# Patient Record
Sex: FEMALE | Race: BLACK OR AFRICAN AMERICAN | Employment: UNEMPLOYED | URBAN - METROPOLITAN AREA
[De-identification: names, ages, dates, MRNs, and addresses within clinical notes are randomized per-mention and may not be internally consistent; named-entity substitution may affect disease eponyms.]

---

## 2017-05-10 ENCOUNTER — HOSPITAL ENCOUNTER (EMERGENCY)
Age: 27
Discharge: HOME | End: 2017-05-10
Attending: EMERGENCY MEDICINE
Payer: SELF-PAY

## 2017-05-10 VITALS
RESPIRATION RATE: 16 BRPM | TEMPERATURE: 98.7 F | HEIGHT: 72 IN | HEART RATE: 94 BPM | BODY MASS INDEX: 28.71 KG/M2 | OXYGEN SATURATION: 98 % | SYSTOLIC BLOOD PRESSURE: 136 MMHG | WEIGHT: 212 LBS | DIASTOLIC BLOOD PRESSURE: 86 MMHG

## 2017-05-10 DIAGNOSIS — L02.31 ABSCESS OF BUTTOCK, RIGHT: Primary | ICD-10-CM

## 2017-05-10 PROCEDURE — 99282 EMERGENCY DEPT VISIT SF MDM: CPT

## 2017-05-10 RX ORDER — TRAMADOL HYDROCHLORIDE 50 MG/1
50 TABLET ORAL
Qty: 10 TAB | Refills: 0 | Status: ON HOLD | OUTPATIENT
Start: 2017-05-10 | End: 2018-02-28

## 2017-05-10 RX ORDER — SULFAMETHOXAZOLE AND TRIMETHOPRIM 800; 160 MG/1; MG/1
2 TABLET ORAL 2 TIMES DAILY
Qty: 40 TAB | Refills: 0 | Status: SHIPPED | OUTPATIENT
Start: 2017-05-10 | End: 2017-05-20

## 2017-05-10 RX ORDER — CEPHALEXIN 500 MG/1
500 CAPSULE ORAL 4 TIMES DAILY
Qty: 40 CAP | Refills: 0 | Status: SHIPPED | OUTPATIENT
Start: 2017-05-10 | End: 2017-05-20

## 2017-05-10 RX ORDER — NAPROXEN 500 MG/1
500 TABLET ORAL
Qty: 20 TAB | Refills: 0 | Status: ON HOLD | OUTPATIENT
Start: 2017-05-10 | End: 2018-02-28

## 2017-05-10 NOTE — ED PROVIDER NOTES
Patient is a 33 y.o. female presenting with abscess. The history is provided by the patient. Abscess    This is a new (Painful red nodule to Rt buttock near anus x 1 week w/ second nodule next to it x 2 days.) problem. The current episode started more than 1 week ago. The problem has been gradually worsening. There has been no fever. The rash is present on the right buttock. The pain is at a severity of 9/10. The pain has been constant since onset. Associated symptoms include pain. Pertinent negatives include no blisters, no itching, no weeping and no hives. She has tried nothing for the symptoms. Past Medical History:   Diagnosis Date    Hypertension     Other ill-defined conditions     muscle spasms    Other ill-defined conditions     a fib    Other ill-defined conditions     angina       Past Surgical History:   Procedure Laterality Date    HX ORTHOPAEDIC      left knee         History reviewed. No pertinent family history. Social History     Social History    Marital status: SINGLE     Spouse name: N/A    Number of children: N/A    Years of education: N/A     Occupational History    Not on file. Social History Main Topics    Smoking status: Current Every Day Smoker     Years: 0.50    Smokeless tobacco: Not on file    Alcohol use No      Comment: occassionally    Drug use: Yes     Special: Marijuana    Sexual activity: Yes     Partners: Male     Birth control/ protection: None     Other Topics Concern    Not on file     Social History Narrative         ALLERGIES: Onion    Review of Systems   Constitutional: Negative. Negative for activity change, chills and fever. HENT: Negative. Negative for congestion, ear pain, facial swelling and sore throat. Eyes: Negative. Negative for pain. Respiratory: Negative. Negative for cough and shortness of breath. Cardiovascular: Negative for chest pain. Gastrointestinal: Positive for vomiting (Pt states she vomitted last PM d/t pain. No n/v currrently. ). Negative for abdominal pain, constipation, diarrhea and nausea. Genitourinary: Negative. Negative for dysuria. Musculoskeletal: Negative. Negative for joint swelling. Skin: Positive for rash. Negative for itching and wound. Neurological: Negative for dizziness, light-headedness, numbness and headaches. Psychiatric/Behavioral: Negative. Vitals:    05/10/17 1744   BP: 136/86   Pulse: 94   Resp: 16   Temp: 98.7 °F (37.1 °C)   SpO2: 98%   Weight: 96.2 kg (212 lb)   Height: 6' 1\" (1.854 m)            Physical Exam   Constitutional: She is oriented to person, place, and time. She appears well-developed and well-nourished. No distress. HENT:   Head: Normocephalic and atraumatic. Right Ear: Hearing and external ear normal.   Left Ear: Hearing and external ear normal.   Nose: Nose normal.   Eyes: Conjunctivae and EOM are normal. Pupils are equal, round, and reactive to light. Neck: Normal range of motion. Pulmonary/Chest: Effort normal. No respiratory distress. Neurological: She is alert and oriented to person, place, and time. No cranial nerve deficit. Skin: Skin is warm and dry. Rash noted. Rash is nodular and pustular. She is not diaphoretic.        2cm dm area of erythema and edema to Rt buttock near anus. +TTP. Neg fluctuance, pustular fluid draining, induration, drainage. Psychiatric: She has a normal mood and affect. Her behavior is normal. Judgment and thought content normal.   Nursing note and vitals reviewed. MDM  Number of Diagnoses or Management Options  Abscess of buttock, right:   Diagnosis management comments: DDx: abscess, perineal abscess, bartholin gland/ cyst/abscess, cellulitis    LABORATORY TESTS:  No results found for this or any previous visit (from the past 12 hour(s)).     IMAGING RESULTS:  No orders to display    MEDICATIONS GIVEN:  Medications - No data to display    IMPRESSION:  Abscess of buttock, right  (primary encounter diagnosis)    PLAN:  1. Current Discharge Medication List    START taking these medications    cephALEXin (KEFLEX) 500 mg capsule  Take 1 Cap by mouth four (4) times daily for 10 days. Qty: 40 Cap Refills: 0    trimethoprim-sulfamethoxazole (BACTRIM DS) 160-800 mg per tablet  Take 2 Tabs by mouth two (2) times a day for 10 days. Qty: 40 Tab Refills: 0    traMADol (ULTRAM) 50 mg tablet  Take 1 Tab by mouth every six (6) hours as needed for Pain. Max Daily Amount: 200 mg. Qty: 10 Tab Refills: 0    naproxen (NAPROSYN) 500 mg tablet  Take 1 Tab by mouth two (2) times daily as needed. Qty: 20 Tab Refills: 0        2. Follow-up Information     Follow up With Details Comments [address], [place] Schedule an appointment as soon as possible for a   visit in 1 week As needed, If symptoms worsen [address] [facility] [place] [address]  [phone]      Vadim An MD Schedule an appointment as soon as possible for a visit   in 1 week As needed, If symptoms worsen [address]  [place] [address]  [phone]        Return to ED if worse               Patient Progress  Patient progress: stable    ED Course       Procedures    6:52 PM  I have discussed with patient their diagnosis, treatment, and follow up plan. The patient agrees to follow up as outlined in discharge paperwork and also to return to the ED with any worsening.  Luca Mead PA-C

## 2017-05-10 NOTE — DISCHARGE INSTRUCTIONS

## 2017-06-19 ENCOUNTER — HOSPITAL ENCOUNTER (EMERGENCY)
Age: 27
Discharge: HOME | End: 2017-06-19
Attending: EMERGENCY MEDICINE
Payer: SELF-PAY

## 2017-06-19 VITALS
TEMPERATURE: 99 F | OXYGEN SATURATION: 97 % | HEART RATE: 93 BPM | WEIGHT: 209 LBS | BODY MASS INDEX: 28.31 KG/M2 | RESPIRATION RATE: 20 BRPM | HEIGHT: 72 IN | DIASTOLIC BLOOD PRESSURE: 81 MMHG | SYSTOLIC BLOOD PRESSURE: 129 MMHG

## 2017-06-19 DIAGNOSIS — L03.317 CELLULITIS OF BUTTOCK: ICD-10-CM

## 2017-06-19 DIAGNOSIS — L02.91 ABSCESS: Primary | ICD-10-CM

## 2017-06-19 PROCEDURE — 74011250637 HC RX REV CODE- 250/637: Performed by: EMERGENCY MEDICINE

## 2017-06-19 PROCEDURE — 99283 EMERGENCY DEPT VISIT LOW MDM: CPT

## 2017-06-19 PROCEDURE — 74011000250 HC RX REV CODE- 250: Performed by: EMERGENCY MEDICINE

## 2017-06-19 PROCEDURE — 75810000289 HC I&D ABSCESS SIMP/COMP/MULT

## 2017-06-19 RX ORDER — LIDOCAINE HYDROCHLORIDE 20 MG/ML
10 INJECTION, SOLUTION INFILTRATION; PERINEURAL ONCE
Status: COMPLETED | OUTPATIENT
Start: 2017-06-19 | End: 2017-06-19

## 2017-06-19 RX ORDER — HYDROCODONE BITARTRATE AND ACETAMINOPHEN 5; 325 MG/1; MG/1
1 TABLET ORAL
Qty: 6 TAB | Refills: 0 | Status: ON HOLD | OUTPATIENT
Start: 2017-06-19 | End: 2018-02-28

## 2017-06-19 RX ORDER — CLINDAMYCIN HYDROCHLORIDE 150 MG/1
300 CAPSULE ORAL
Status: COMPLETED | OUTPATIENT
Start: 2017-06-19 | End: 2017-06-19

## 2017-06-19 RX ORDER — CLINDAMYCIN HYDROCHLORIDE 300 MG/1
300 CAPSULE ORAL 4 TIMES DAILY
Qty: 40 CAP | Refills: 0 | Status: ON HOLD | OUTPATIENT
Start: 2017-06-19 | End: 2018-02-28

## 2017-06-19 RX ORDER — LIDOCAINE HYDROCHLORIDE 20 MG/ML
INJECTION, SOLUTION INFILTRATION; PERINEURAL
Status: DISCONTINUED
Start: 2017-06-19 | End: 2017-06-19 | Stop reason: HOSPADM

## 2017-06-19 RX ADMIN — CLINDAMYCIN HYDROCHLORIDE 300 MG: 150 CAPSULE ORAL at 01:47

## 2017-06-19 RX ADMIN — LIDOCAINE HYDROCHLORIDE 200 MG: 20 INJECTION, SOLUTION INFILTRATION; PERINEURAL at 01:32

## 2017-06-19 NOTE — ED NOTES
Patient given copy of dc instructions and 2 paper script(s) and 0 electronic scripts. Patient verbalized understanding of instructions and script(s). Patient given a current medication reconciliation form and verbalized understanding of their medications. Patient verbalized understanding of the importance of discussing medications with his or her physician or clinic they will be following up with. Patient alert and oriented and in no acute distress. Patient verbalizes pain of 8 out of 10. Pt discharged with prescription for pain management. Patient offered wheelchair from treatment area to hospital entrance, patient declined wheelchair. Patient discharged home with friend.

## 2017-06-19 NOTE — DISCHARGE INSTRUCTIONS
Skin Abscess: Care Instructions  Your Care Instructions    A skin abscess is a bacterial infection that forms a pocket of pus. A boil is a kind of skin abscess. The doctor may have cut an opening in the abscess so that the pus can drain out. You may have gauze in the cut so that the abscess will stay open and keep draining. You may need antibiotics. You will need to follow up with your doctor to make sure the infection has gone away. The doctor has checked you carefully, but problems can develop later. If you notice any problems or new symptoms, get medical treatment right away. Follow-up care is a key part of your treatment and safety. Be sure to make and go to all appointments, and call your doctor if you are having problems. It's also a good idea to know your test results and keep a list of the medicines you take. How can you care for yourself at home? · Apply warm and dry compresses, a heating pad set on low, or a hot water bottle 3 or 4 times a day for pain. Keep a cloth between the heat source and your skin. · If your doctor prescribed antibiotics, take them as directed. Do not stop taking them just because you feel better. You need to take the full course of antibiotics. · Take pain medicines exactly as directed. ¨ If the doctor gave you a prescription medicine for pain, take it as prescribed. ¨ If you are not taking a prescription pain medicine, ask your doctor if you can take an over-the-counter medicine. · Keep your bandage clean and dry. Change the bandage whenever it gets wet or dirty, or at least one time a day. · If the abscess was packed with gauze:  ¨ Keep follow-up appointments to have the gauze changed or removed. If the doctor instructed you to remove the gauze, gently pull out all of the gauze when your doctor tells you to. ¨ After the gauze is removed, soak the area in warm water for 15 to 20 minutes 2 times a day, until the wound closes. When should you call for help?   Call your doctor now or seek immediate medical care if:  · You have signs of worsening infection, such as:  ¨ Increased pain, swelling, warmth, or redness. ¨ Red streaks leading from the infected skin. ¨ Pus draining from the wound. ¨ A fever. Watch closely for changes in your health, and be sure to contact your doctor if:  · You do not get better as expected. Where can you learn more? Go to http://mady-grady.info/. Enter T055 in the search box to learn more about \"Skin Abscess: Care Instructions. \"  Current as of: October 13, 2016  Content Version: 11.2  © 9305-1268 [employer] [facility]. Care instructions adapted under license by [employer] (which disclaims liability or warranty for this information). If you have questions about a medical condition or this instruction, always ask your healthcare professional. [place] [address] any warranty or liability for your use of this information. Cellulitis: Care Instructions  Your Care Instructions    Cellulitis is a skin infection. It often occurs after a break in the skin from a scrape, cut, bite, or puncture, or after a rash. The doctor has checked you carefully, but problems can develop later. If you notice any problems or new symptoms, get medical treatment right away. Follow-up care is a key part of your treatment and safety. Be sure to make and go to all appointments, and call your doctor if you are having problems. It's also a good idea to know your test results and keep a list of the medicines you take. How can you care for yourself at home? · Take your antibiotics as directed. Do not stop taking them just because you feel better. You need to take the full course of antibiotics. · Prop up the infected area on pillows to reduce pain and swelling. Try to keep the area above the level of your heart as often as you can.   · If your doctor told you how to care for your wound, follow your doctor's instructions. If you did not get instructions, follow this general advice:  ¨ Wash the wound with clean water 2 times a day. Don't use hydrogen peroxide or alcohol, which can slow healing. ¨ You may cover the wound with a thin layer of petroleum jelly, such as Vaseline, and a nonstick bandage. ¨ Apply more petroleum jelly and replace the bandage as needed. · Be safe with medicines. Take pain medicines exactly as directed. ¨ If the doctor gave you a prescription medicine for pain, take it as prescribed. ¨ If you are not taking a prescription pain medicine, ask your doctor if you can take an over-the-counter medicine. To prevent cellulitis in the future  · Try to prevent cuts, scrapes, or other injuries to your skin. Cellulitis most often occurs where there is a break in the skin. · If you get a scrape, cut, mild burn, or bite, wash the wound with clean water as soon as you can to help avoid infection. Don't use hydrogen peroxide or alcohol, which can slow healing. · If you have swelling in your legs (edema), support stockings and good skin care may help prevent leg sores and cellulitis. · Take care of your feet, especially if you have diabetes or other conditions that increase the risk of infection. Wear shoes and socks. Do not go barefoot. If you have athlete's foot or other skin problems on your feet, talk to your doctor about how to treat them. When should you call for help? Call your doctor now or seek immediate medical care if:  · You have signs that your infection is getting worse, such as:  ¨ Increased pain, swelling, warmth, or redness. ¨ Red streaks leading from the area. ¨ Pus draining from the area. ¨ A fever. · You get a rash. Watch closely for changes in your health, and be sure to contact your doctor if:  · You are not getting better after 1 day (24 hours). · You do not get better as expected. Where can you learn more? Go to http://susan.info/.   Buck Shay in the search box to learn more about \"Cellulitis: Care Instructions. \"  Current as of: October 13, 2016  Content Version: 11.2  © 4118-5659 [employer]. Care instructions adapted under license by [employer] (which disclaims liability or warranty for this information). If you have questions about a medical condition or this instruction, always ask your healthcare professional. [place] [address] any warranty or liability for your use of this information.

## 2017-06-19 NOTE — ED NOTES
Pt presents to ED ambulatory with complaint(s) of abscess along right side of inner buttock x 2 months. Pt reports she has had it treated with just antibiotics. Pt is alert and oriented x4 and in no apparent distress. Emergency Department Nursing Plan of Care       The Nursing Plan of Care is developed from the Nursing assessment and Emergency Department Attending provider initial evaluation. The plan of care may be reviewed in the ED Provider note.     The Plan of Care was developed with the following considerations:   Patient / Family readiness to learn indicated by:verbalized understanding  Persons(s) to be included in education: patient  Barriers to Learning/Limitations:No    Signed     Arti Keller RN    6/19/2017   1:17 AM

## 2017-06-19 NOTE — ED PROVIDER NOTES
HPI Comments: Lorenzo Harrison is a 33 y.o. female with significant PMHx of hypertension, presents ambulatory to the ED with c/o \"boil\" to right buttock x 2 months. Pt reports she had the \"boil\" drained, however did not follow up with anyone. She states \"antibiotics don't work\" and she has not seen surgeon. Pt reports her LMP was at the beginning of this month. Pt denies chance of pregnancy, injury, fever, chest pain, SOB, or any urinary symptoms. PCP: Josefa Spears MD  Social Hx: + tobacco (1 ppd). +EtOH (occasional)    There are no other complaints, changes or physical findings at this time. Written by ROXANA Castroibkalee, as dictated by Cecil Frankel, MD      The history is provided by the patient. No  was used. Past Medical History:   Diagnosis Date    Hypertension     Other ill-defined conditions     muscle spasms    Other ill-defined conditions     a fib    Other ill-defined conditions     angina       Past Surgical History:   Procedure Laterality Date    HX ORTHOPAEDIC      left knee         History reviewed. No pertinent family history. Social History     Social History    Marital status: SINGLE     Spouse name: N/A    Number of children: N/A    Years of education: N/A     Occupational History    Not on file. Social History Main Topics    Smoking status: Current Every Day Smoker     Packs/day: 1.00     Years: 0.50    Smokeless tobacco: Not on file    Alcohol use No      Comment: occassionally    Drug use: Yes     Special: Marijuana    Sexual activity: Yes     Partners: Male     Birth control/ protection: None     Other Topics Concern    Not on file     Social History Narrative         ALLERGIES: Onion    Review of Systems   Constitutional: Negative for appetite change, chills, diaphoresis, fatigue and fever. HENT: Negative for sore throat and trouble swallowing. Respiratory: Negative for cough and shortness of breath. Cardiovascular: Negative for chest pain. Gastrointestinal: Negative for abdominal pain, diarrhea, nausea and vomiting. Genitourinary: Negative for dysuria and frequency. Musculoskeletal: Negative for arthralgias, back pain and myalgias. Skin: Positive for wound (\"boil\" to right buttock). Negative for color change and rash. Neurological: Negative for weakness and numbness. Hematological: Does not bruise/bleed easily. All other systems reviewed and are negative. Vitals:    06/19/17 0053   BP: 129/81   Pulse: 93   Resp: 20   Temp: 99 °F (37.2 °C)   SpO2: 97%   Weight: 94.8 kg (209 lb)   Height: 6' 1\" (1.854 m)            Physical Exam   Constitutional: She is oriented to person, place, and time. She appears well-developed and well-nourished. No distress. HENT:   Head: Normocephalic and atraumatic. Mouth/Throat: Oropharynx is clear and moist. No oropharyngeal exudate or posterior oropharyngeal erythema. Neck: Normal range of motion and full passive range of motion without pain. Neck supple. Cardiovascular: Normal rate, regular rhythm, normal heart sounds, intact distal pulses and normal pulses. Exam reveals no gallop and no friction rub. No murmur heard. Pulmonary/Chest: Effort normal and breath sounds normal. No accessory muscle usage. No respiratory distress. She has no decreased breath sounds. She has no wheezes. She has no rhonchi. She has no rales. Abdominal: Soft. Bowel sounds are normal. She exhibits no distension. There is no tenderness. There is no rebound, no guarding and no CVA tenderness. Musculoskeletal: Normal range of motion. She exhibits no edema or tenderness. Thoracic back: She exhibits no tenderness and no bony tenderness. Lumbar back: She exhibits no tenderness and no bony tenderness. Lymphadenopathy:     She has no cervical adenopathy. Neurological: She is alert and oriented to person, place, and time. She has normal strength.  She is not disoriented. No cranial nerve deficit or sensory deficit. No focal deficits; 5/5 muscle strength in all extremities   Skin: Skin is warm. No lesion and no rash noted. Rash is not nodular. She is not diaphoretic. Right buttock abscess    Nursing note and vitals reviewed. MDM  Number of Diagnoses or Management Options  Diagnosis management comments:       DDx: cellulitis, abscess, carbuncle    Patient Progress  Patient progress: stable    ED Course       Procedures      Procedure Note - Incision and Drainage:   1:30 AM  Performed by: Nohelia Pierce MD  Complexity: Simple  Skin prepped with Betadine. Sterile field established. Anesthesia achieved with 5 mLs of Lidocaine 2% without epinephrine using a local infiltration. Abscess to buttocks was incised with # 11 blade, and moderate amount of pus drainage was expressed. Wound probed and irrigated. Sterile dressing applied. Estimated blood loss: less than 0.5 mL  The procedure took 1-15 minutes, and pt tolerated well. Written by Honorio Trejo ED Scribe, as dictated by Nohelia Pierce MD.       MEDICATIONS GIVEN:  Medications   lidocaine (XYLOCAINE) 20 mg/mL (2 %) injection 200 mg (200 mg SubCUTAneous Given by Provider 6/19/17 0132)   clindamycin (CLEOCIN) capsule 300 mg (300 mg Oral Given 6/19/17 0147)       IMPRESSION:  1. Abscess    2. Cellulitis of buttock        PLAN:  1. Discharge Medication List as of 6/19/2017  1:43 AM      START taking these medications    Details   clindamycin (CLEOCIN) 300 mg capsule Take 1 Cap by mouth four (4) times daily. , Print, Disp-40 Cap, R-0      HYDROcodone-acetaminophen (NORCO) 5-325 mg per tablet Take 1 Tab by mouth every four (4) hours as needed for Pain. Max Daily Amount: 6 Tabs., Print, Disp-6 Tab, R-0         CONTINUE these medications which have NOT CHANGED    Details   traMADol (ULTRAM) 50 mg tablet Take 1 Tab by mouth every six (6) hours as needed for Pain.  Max Daily Amount: 200 mg., Print, Disp-10 Tab, R-0      naproxen (NAPROSYN) 500 mg tablet Take 1 Tab by mouth two (2) times daily as needed., Normal, Disp-20 Tab, R-0           2. Follow-up Information     Follow up With Details Comments [address], [place]   [address] [phone]          Return to ED if worse     DISCHARGE NOTE  1:45 AM  The patient has been re-evaluated and is ready for discharge. Reviewed available results with patient. Counseled pt on diagnosis and care plan. Pt has expressed understanding, and all questions have been answered. Pt agrees with plan and agrees to F/U as recommended, or return to the ED if their sxs worsen. Discharge instructions have been provided and explained to the pt, along with reasons to return to the ED. Written by Mani Hartman, ED Scribe, as dictated by Glenny Toribio MD.      This note is prepared by Mani Hartman, acting as Scribe for Glenny Toribio MD.    Glenny Toribio MD : The scribe's documentation has been prepared under my direction and personally reviewed by me in its entirety.  I confirm that the note above accurately reflects all work, treatment, procedures, and medical decision making performed by me.    '

## 2017-06-19 NOTE — ED NOTES
Patient has been instructed that they have been given a prescription for Norco which contains opioids, benzodiazepines, or other sedating drugs. Patient is aware that they will need to refrain from driving or operating heavy machinery after taking this medication. Patient also instructed that they need to avoid drinking alcohol and using other products containing opioids, benzodiazepines, or other sedating drugs. Patient verbalized understanding.

## 2017-12-05 ENCOUNTER — HOSPITAL ENCOUNTER (EMERGENCY)
Age: 27
Discharge: HOME | End: 2017-12-05
Attending: INTERNAL MEDICINE
Payer: SELF-PAY

## 2017-12-05 VITALS
TEMPERATURE: 98.8 F | HEART RATE: 84 BPM | SYSTOLIC BLOOD PRESSURE: 170 MMHG | HEIGHT: 72 IN | BODY MASS INDEX: 28.44 KG/M2 | DIASTOLIC BLOOD PRESSURE: 131 MMHG | WEIGHT: 210 LBS | OXYGEN SATURATION: 99 % | RESPIRATION RATE: 16 BRPM

## 2017-12-05 DIAGNOSIS — R21 RASH: Primary | ICD-10-CM

## 2017-12-05 PROCEDURE — 99283 EMERGENCY DEPT VISIT LOW MDM: CPT

## 2017-12-05 PROCEDURE — 74011250637 HC RX REV CODE- 250/637: Performed by: INTERNAL MEDICINE

## 2017-12-05 RX ORDER — FAMOTIDINE 20 MG/1
20 TABLET, FILM COATED ORAL
Status: COMPLETED | OUTPATIENT
Start: 2017-12-05 | End: 2017-12-05

## 2017-12-05 RX ORDER — DIPHENHYDRAMINE HCL 25 MG
25 CAPSULE ORAL
Status: COMPLETED | OUTPATIENT
Start: 2017-12-05 | End: 2017-12-05

## 2017-12-05 RX ADMIN — DIPHENHYDRAMINE HYDROCHLORIDE 25 MG: 25 CAPSULE ORAL at 22:43

## 2017-12-05 RX ADMIN — FAMOTIDINE 20 MG: 20 TABLET, FILM COATED ORAL at 22:44

## 2017-12-06 NOTE — ED NOTES
Pt in ED w/ complaint of small fine bumps on bilateral upper arm and upper back X 4 days. Pt denies itching. Pt is A&O X 4 and appears in no distress. Emergency Department Nursing Plan of Care       The Nursing Plan of Care is developed from the Nursing assessment and Emergency Department Attending provider initial evaluation. The plan of care may be reviewed in the ED Provider note.     The Plan of Care was developed with the following considerations:   Patient / Family readiness to learn indicated by:verbalized understanding  Persons(s) to be included in education: patient and family  Barriers to Learning/Limitations:No    Signed     Fredo Hernandez RN    12/5/2017   11:01 PM

## 2017-12-06 NOTE — ED PROVIDER NOTES
EMERGENCY DEPARTMENT HISTORY AND PHYSICAL EXAM      Date: 12/5/2017  Patient Name: Karson Valencia    History of Presenting Illness     Chief Complaint   Patient presents with    Rash       History Provided By: Patient    HPI: Karson Valencia, 33 y.o. female presents ambulatory to the ED with concern for a rash x 4 days due to a possible allergic reaction. Pt denies any new lotions, detergents or soaps. Due to the patient primary complaint, there is no indication for symptom severity, quality, or modifying factors at this time. PCP: Edith Jackson MD    There are no other complaints, changes, or physical findings at this time. Current Facility-Administered Medications   Medication Dose Route Frequency Provider Last Rate Last Dose    diphenhydrAMINE (BENADRYL) capsule 25 mg  25 mg Oral NOW Rayne Figueroa MD        famotidine (PEPCID) tablet 20 mg  20 mg Oral NOW Rayne Figueroa MD         Current Outpatient Prescriptions   Medication Sig Dispense Refill    triamcinolone-dimethicone 0.1-5 % ktoc 1 Inch by Apply Externally route two (2) times a day. 15 g 0    clindamycin (CLEOCIN) 300 mg capsule Take 1 Cap by mouth four (4) times daily. 40 Cap 0    HYDROcodone-acetaminophen (NORCO) 5-325 mg per tablet Take 1 Tab by mouth every four (4) hours as needed for Pain. Max Daily Amount: 6 Tabs. 6 Tab 0    traMADol (ULTRAM) 50 mg tablet Take 1 Tab by mouth every six (6) hours as needed for Pain. Max Daily Amount: 200 mg. 10 Tab 0    naproxen (NAPROSYN) 500 mg tablet Take 1 Tab by mouth two (2) times daily as needed.  20 Tab 0       Past History     Past Medical History:  Past Medical History:   Diagnosis Date    Hypertension     Other ill-defined conditions     muscle spasms    Other ill-defined conditions     a fib    Other ill-defined conditions     angina       Past Surgical History:  Past Surgical History:   Procedure Laterality Date    HX ORTHOPAEDIC      left knee       Family History:  No family history on file. Social History:  Social History   Substance Use Topics    Smoking status: Current Every Day Smoker     Packs/day: 1.00     Years: 0.50    Smokeless tobacco: Not on file    Alcohol use No      Comment: occassionally       Allergies: Allergies   Allergen Reactions    Onion Anaphylaxis         Review of Systems   Review of Systems   Constitutional: Negative for chills and fever. HENT: Negative for congestion, rhinorrhea and sore throat. Eyes: Negative for discharge and redness. Respiratory: Negative for cough and shortness of breath. Cardiovascular: Negative for chest pain. Gastrointestinal: Negative for abdominal distention, abdominal pain, constipation, diarrhea and nausea. Genitourinary: Negative for decreased urine volume and urgency. Musculoskeletal: Negative for arthralgias and back pain. Skin: Positive for rash. Neurological: Negative for dizziness, weakness, numbness and headaches. Psychiatric/Behavioral: Negative for confusion and decreased concentration. All other systems reviewed and are negative. Physical Exam   Physical Exam   Constitutional: She is oriented to person, place, and time. She appears well-developed and well-nourished. HENT:   Head: Normocephalic and atraumatic. Mouth/Throat: Oropharynx is clear and moist.   Eyes: Conjunctivae and EOM are normal. Pupils are equal, round, and reactive to light. Neck: Normal range of motion. Neck supple. Cardiovascular: Normal rate, regular rhythm and normal heart sounds. Exam reveals no gallop and no friction rub. No murmur heard. Pulmonary/Chest: Effort normal and breath sounds normal. No respiratory distress. She has no wheezes. She has no rales. Abdominal: Soft. Bowel sounds are normal. She exhibits no distension. There is no tenderness. There is no rebound and no guarding. Musculoskeletal: Normal range of motion. She exhibits no edema or tenderness.    Lymphadenopathy:     She has no cervical adenopathy. Neurological: She is alert and oriented to person, place, and time. She has normal strength. No cranial nerve deficit or sensory deficit. She displays a negative Romberg sign. Coordination and gait normal.   Skin: Skin is warm and dry. No ecchymosis, no lesion and no rash noted. Rash is not urticarial. She is not diaphoretic. No erythema. Psychiatric: She has a normal mood and affect. Nursing note and vitals reviewed. Medical Decision Making   I am the first provider for this patient. I reviewed the vital signs, available nursing notes, past medical history, past surgical history, family history and social history. Vital Signs-Reviewed the patient's vital signs. Patient Vitals for the past 12 hrs:   Temp Pulse Resp BP SpO2   12/05/17 2139 98.8 °F (37.1 °C) 84 16 (!) 170/131 99 %         Records Reviewed: Nursing Notes and Old Medical Records      ED Course:   10:08  Initial assessment performed. The patients presenting problems have been discussed, and they are in agreement with the care plan formulated and outlined with them. I have encouraged them to ask questions as they arise throughout their visit. Disposition:  DISCHARGE NOTE:  10:22 PM  The patient has been re-evaluated and is ready for discharge. Reviewed available results with patient. Counseled patient on diagnosis and care plan. Patient has expressed understanding, and all questions have been answered. Patient agrees with plan and agrees to follow up as recommended, or return to the ED if their symptoms worsen. Discharge instructions have been provided and explained to the patient, along with reasons to return to the ED. PLAN:  1. Current Discharge Medication List      START taking these medications    Details   triamcinolone-dimethicone 0.1-5 % ktoc 1 Inch by Apply Externally route two (2) times a day. Qty: 15 g, Refills: 0           2.    Follow-up Information     Follow up With Details Comments Contact Info    Carla Martini MD In 2 days If symptoms worsen [address] [facility]  [phone]          Return to ED if worse     Diagnosis     Clinical Impression:   1. Rash        Attestations:    ATTESTATION:  This note is prepared by Ynes Johns, acting as Scribe for Mima Elliott MD.     Mima Elliott MD: The scribe's documentation has been prepared under my direction and personally reviewed by me in its entirety. I confirm that the note above accurately reflects all work, treatment, procedures, and medical decision making performed by me.

## 2017-12-06 NOTE — DISCHARGE INSTRUCTIONS
Rash: Care Instructions  Your Care Instructions  A rash is any irritation or inflammation of the skin. Rashes have many possible causes, including allergy, infection, illness, heat, and emotional stress. Follow-up care is a key part of your treatment and safety. Be sure to make and go to all appointments, and call your doctor if you are having problems. It's also a good idea to know your test results and keep a list of the medicines you take. How can you care for yourself at home? · Wash the area with water only. Soap can make dryness and itching worse. Pat dry. · Put cold, wet cloths on the rash to reduce itching. · Keep cool, and stay out of the sun. · Leave the rash open to the air as much of the time as possible. · Sometimes petroleum jelly (Vaseline) can help relieve the discomfort caused by a rash. A moisturizing lotion, such as Cetaphil, also may help. Calamine lotion may help for rashes caused by contact with something (such as a plant or soap) that irritated the skin. Use it 3 or 4 times a day. · If your doctor prescribed a cream, use it as directed. If your doctor prescribed medicine, take it exactly as directed. · If your rash itches so badly that it interferes with your normal activities, take an over-the-counter antihistamine, such as diphenhydramine (Benadryl) or loratadine (Claritin). Read and follow all instructions on the label. When should you call for help? Call your doctor now or seek immediate medical care if:  ? · You have signs of infection, such as:  ¨ Increased pain, swelling, warmth, or redness. ¨ Red streaks leading from the area. ¨ Pus draining from the area. ¨ A fever. ? · You have joint pain along with the rash. ? Watch closely for changes in your health, and be sure to contact your doctor if:  ? · Your rash is changing or getting worse. For example, call if you have pain along with the rash, the rash is spreading, or you have new blisters.    ? · You do not get better after 1 week. Where can you learn more? Go to http://mady-grady.info/. Enter P183 in the search box to learn more about \"Rash: Care Instructions. \"  Current as of: October 13, 2016  Content Version: 11.4  © 9878-8471 Healthwise, [employer]. Care instructions adapted under license by [employer] (which disclaims liability or warranty for this information). If you have questions about a medical condition or this instruction, always ask your healthcare professional. [address] any warranty or liability for your use of this information.

## 2018-02-27 ENCOUNTER — HOSPITAL ENCOUNTER (INPATIENT)
Age: 28
LOS: 6 days | Discharge: HOME | DRG: 881 | End: 2018-03-05
Attending: PSYCHIATRY & NEUROLOGY | Admitting: PSYCHIATRY & NEUROLOGY
Payer: COMMERCIAL

## 2018-02-27 ENCOUNTER — HOSPITAL ENCOUNTER (EMERGENCY)
Age: 28
Discharge: OTHER INSTITUTION | End: 2018-02-27
Attending: EMERGENCY MEDICINE | Admitting: EMERGENCY MEDICINE
Payer: SELF-PAY

## 2018-02-27 VITALS
WEIGHT: 212 LBS | TEMPERATURE: 98.2 F | OXYGEN SATURATION: 100 % | HEART RATE: 63 BPM | SYSTOLIC BLOOD PRESSURE: 113 MMHG | DIASTOLIC BLOOD PRESSURE: 53 MMHG | RESPIRATION RATE: 16 BRPM | HEIGHT: 72 IN | BODY MASS INDEX: 28.71 KG/M2

## 2018-02-27 DIAGNOSIS — Z32.01 PREGNANCY TEST PERFORMED, PREGNANCY CONFIRMED: Primary | ICD-10-CM

## 2018-02-27 DIAGNOSIS — R45.851 SUICIDAL IDEATION: ICD-10-CM

## 2018-02-27 DIAGNOSIS — F12.10 MARIJUANA ABUSE: ICD-10-CM

## 2018-02-27 PROBLEM — F32.A DEPRESSION: Status: ACTIVE | Noted: 2018-02-27

## 2018-02-27 LAB
ALBUMIN SERPL-MCNC: 3.2 G/DL (ref 3.5–5)
ALBUMIN/GLOB SERPL: 0.7 {RATIO} (ref 1.1–2.2)
ALP SERPL-CCNC: 106 U/L (ref 45–117)
ALT SERPL-CCNC: 7 U/L (ref 12–78)
AMPHET UR QL SCN: NEGATIVE
ANION GAP SERPL CALC-SCNC: 8 MMOL/L (ref 5–15)
APAP SERPL-MCNC: <2 UG/ML (ref 10–30)
APPEARANCE UR: CLEAR
AST SERPL-CCNC: 10 U/L (ref 15–37)
BACTERIA URNS QL MICRO: NEGATIVE /HPF
BARBITURATES UR QL SCN: NEGATIVE
BASOPHILS # BLD: 0 K/UL (ref 0–0.1)
BASOPHILS NFR BLD: 0 % (ref 0–1)
BENZODIAZ UR QL: NEGATIVE
BILIRUB SERPL-MCNC: 0.2 MG/DL (ref 0.2–1)
BILIRUB UR QL: NEGATIVE
BUN SERPL-MCNC: 5 MG/DL (ref 6–20)
BUN/CREAT SERPL: 8 (ref 12–20)
CALCIUM SERPL-MCNC: 9.3 MG/DL (ref 8.5–10.1)
CANNABINOIDS UR QL SCN: POSITIVE
CHLORIDE SERPL-SCNC: 103 MMOL/L (ref 97–108)
CO2 SERPL-SCNC: 26 MMOL/L (ref 21–32)
COCAINE UR QL SCN: NEGATIVE
COLOR UR: ABNORMAL
CREAT SERPL-MCNC: 0.61 MG/DL (ref 0.55–1.02)
DIFFERENTIAL METHOD BLD: ABNORMAL
DRUG SCRN COMMENT,DRGCM: ABNORMAL
EOSINOPHIL # BLD: 0.1 K/UL (ref 0–0.4)
EOSINOPHIL NFR BLD: 1 % (ref 0–7)
EPITH CASTS URNS QL MICRO: ABNORMAL /LPF
ERYTHROCYTE [DISTWIDTH] IN BLOOD BY AUTOMATED COUNT: 13.6 % (ref 11.5–14.5)
ETHANOL SERPL-MCNC: <10 MG/DL
GLOBULIN SER CALC-MCNC: 4.4 G/DL (ref 2–4)
GLUCOSE SERPL-MCNC: 68 MG/DL (ref 65–100)
GLUCOSE UR STRIP.AUTO-MCNC: NEGATIVE MG/DL
HCG UR QL: POSITIVE
HCT VFR BLD AUTO: 33.3 % (ref 35–47)
HGB BLD-MCNC: 11.6 G/DL (ref 11.5–16)
HGB UR QL STRIP: NEGATIVE
IMM GRANULOCYTES # BLD: 0 K/UL (ref 0–0.04)
IMM GRANULOCYTES NFR BLD AUTO: 0 % (ref 0–0.5)
KETONES UR QL STRIP.AUTO: NEGATIVE MG/DL
LEUKOCYTE ESTERASE UR QL STRIP.AUTO: NEGATIVE
LYMPHOCYTES # BLD: 4 K/UL (ref 0.8–3.5)
LYMPHOCYTES NFR BLD: 39 % (ref 12–49)
MCH RBC QN AUTO: 31.3 PG (ref 26–34)
MCHC RBC AUTO-ENTMCNC: 34.8 G/DL (ref 30–36.5)
MCV RBC AUTO: 89.8 FL (ref 80–99)
METHADONE UR QL: NEGATIVE
MONOCYTES # BLD: 0.4 K/UL (ref 0–1)
MONOCYTES NFR BLD: 4 % (ref 5–13)
MUCOUS THREADS URNS QL MICRO: ABNORMAL /LPF
NEUTS SEG # BLD: 5.7 K/UL (ref 1.8–8)
NEUTS SEG NFR BLD: 55 % (ref 32–75)
NITRITE UR QL STRIP.AUTO: NEGATIVE
NRBC # BLD: 0 K/UL (ref 0–0.01)
NRBC BLD-RTO: 0 PER 100 WBC
OPIATES UR QL: NEGATIVE
PCP UR QL: NEGATIVE
PH UR STRIP: 6 [PH] (ref 5–8)
PLATELET # BLD AUTO: 362 K/UL (ref 150–400)
PMV BLD AUTO: 9.7 FL (ref 8.9–12.9)
POTASSIUM SERPL-SCNC: 4 MMOL/L (ref 3.5–5.1)
PROT SERPL-MCNC: 7.6 G/DL (ref 6.4–8.2)
PROT UR STRIP-MCNC: NEGATIVE MG/DL
RBC # BLD AUTO: 3.71 M/UL (ref 3.8–5.2)
RBC #/AREA URNS HPF: ABNORMAL /HPF (ref 0–5)
SALICYLATES SERPL-MCNC: 2.7 MG/DL (ref 2.8–20)
SODIUM SERPL-SCNC: 137 MMOL/L (ref 136–145)
SP GR UR REFRACTOMETRY: 1.02 (ref 1–1.03)
UA: UC IF INDICATED,UAUC: ABNORMAL
UROBILINOGEN UR QL STRIP.AUTO: 1 EU/DL (ref 0.2–1)
WBC # BLD AUTO: 10.3 K/UL (ref 3.6–11)
WBC URNS QL MICRO: ABNORMAL /HPF (ref 0–4)

## 2018-02-27 PROCEDURE — 74011250637 HC RX REV CODE- 250/637: Performed by: PHYSICIAN ASSISTANT

## 2018-02-27 PROCEDURE — 36415 COLL VENOUS BLD VENIPUNCTURE: CPT | Performed by: PHYSICIAN ASSISTANT

## 2018-02-27 PROCEDURE — 80307 DRUG TEST PRSMV CHEM ANLYZR: CPT | Performed by: PHYSICIAN ASSISTANT

## 2018-02-27 PROCEDURE — 93005 ELECTROCARDIOGRAM TRACING: CPT

## 2018-02-27 PROCEDURE — 99284 EMERGENCY DEPT VISIT MOD MDM: CPT

## 2018-02-27 PROCEDURE — 81025 URINE PREGNANCY TEST: CPT

## 2018-02-27 PROCEDURE — 85025 COMPLETE CBC W/AUTO DIFF WBC: CPT | Performed by: PHYSICIAN ASSISTANT

## 2018-02-27 PROCEDURE — 81001 URINALYSIS AUTO W/SCOPE: CPT | Performed by: PHYSICIAN ASSISTANT

## 2018-02-27 PROCEDURE — 65220000003 HC RM SEMIPRIVATE PSYCH

## 2018-02-27 PROCEDURE — 80053 COMPREHEN METABOLIC PANEL: CPT | Performed by: PHYSICIAN ASSISTANT

## 2018-02-27 RX ORDER — BENZTROPINE MESYLATE 1 MG/ML
2 INJECTION INTRAMUSCULAR; INTRAVENOUS
Status: DISCONTINUED | OUTPATIENT
Start: 2018-02-27 | End: 2018-03-05 | Stop reason: HOSPADM

## 2018-02-27 RX ORDER — ZOLPIDEM TARTRATE 5 MG/1
5 TABLET ORAL
Status: DISCONTINUED | OUTPATIENT
Start: 2018-02-27 | End: 2018-02-28

## 2018-02-27 RX ORDER — ONDANSETRON 4 MG/1
4 TABLET, ORALLY DISINTEGRATING ORAL
Status: COMPLETED | OUTPATIENT
Start: 2018-02-27 | End: 2018-02-27

## 2018-02-27 RX ORDER — BENZTROPINE MESYLATE 2 MG/1
2 TABLET ORAL
Status: DISCONTINUED | OUTPATIENT
Start: 2018-02-27 | End: 2018-03-05 | Stop reason: HOSPADM

## 2018-02-27 RX ORDER — ACETAMINOPHEN 325 MG/1
650 TABLET ORAL
Status: DISCONTINUED | OUTPATIENT
Start: 2018-02-27 | End: 2018-03-05 | Stop reason: HOSPADM

## 2018-02-27 RX ORDER — IBUPROFEN 200 MG
1 TABLET ORAL
Status: DISCONTINUED | OUTPATIENT
Start: 2018-02-27 | End: 2018-03-05 | Stop reason: HOSPADM

## 2018-02-27 RX ORDER — LORAZEPAM 1 MG/1
1 TABLET ORAL
Status: DISCONTINUED | OUTPATIENT
Start: 2018-02-27 | End: 2018-02-28

## 2018-02-27 RX ORDER — LORAZEPAM 2 MG/ML
2 INJECTION INTRAMUSCULAR
Status: DISCONTINUED | OUTPATIENT
Start: 2018-02-27 | End: 2018-02-28

## 2018-02-27 RX ORDER — ZOLPIDEM TARTRATE 10 MG/1
10 TABLET ORAL
Status: DISCONTINUED | OUTPATIENT
Start: 2018-02-27 | End: 2018-02-27 | Stop reason: CLARIF

## 2018-02-27 RX ORDER — ADHESIVE BANDAGE
30 BANDAGE TOPICAL DAILY PRN
Status: DISCONTINUED | OUTPATIENT
Start: 2018-02-27 | End: 2018-03-05 | Stop reason: HOSPADM

## 2018-02-27 RX ORDER — OLANZAPINE 5 MG/1
5 TABLET ORAL
Status: DISCONTINUED | OUTPATIENT
Start: 2018-02-27 | End: 2018-03-05 | Stop reason: HOSPADM

## 2018-02-27 RX ADMIN — ONDANSETRON 4 MG: 4 TABLET, ORALLY DISINTEGRATING ORAL at 16:19

## 2018-02-27 NOTE — IP AVS SNAPSHOT
[address] [place] [address] 
[phone] Patient: Lori Couch MRN: [id number] :1990 A check harjinder indicates which time of day the medication should be taken. My Medications START taking these medications Instructions Each Dose to Equal  
 Morning Noon Evening Bedtime  
 prenatal vit-iron fumarate-fa 28 mg iron- 800 mcg Tab Commonly known as:  PRENATAL PLUS with IRON Start taking on:  3/6/2018 Your last dose was: Today 9 am  
Your next dose is:  Tomorrow 9 am  
   
 Take 1 Tab by mouth daily. Indications: pregnancy 1 Tab  
    
  
   
   
   
  
 sertraline 25 mg tablet Commonly known as:  ZOLOFT Start taking on:  3/6/2018 Take 1 Tab by mouth daily. Indications: ANXIETY WITH DEPRESSION  
 25 mg Where to Get Your Medications Information on where to get these meds will be given to you by the nurse or doctor. ! Ask your nurse or doctor about these medications  
  prenatal vit-iron fumarate-fa 28 mg iron- 800 mcg Tab  
 sertraline 25 mg tablet

## 2018-02-27 NOTE — IP AVS SNAPSHOT
[address] [facility] [address] 
[phone] Patient: Pura Rao MRN: [id number] :1990 About your hospitalization You were admitted on:  2018 You last received care in the:  [facility] You were discharged on:  2018 Why you were hospitalized Your primary diagnosis was:  Depression Follow-up Information Follow up With Details Comments Contact import2 On 3/6/2018 wall in for services Monday - Friday for rapid assess - 8:30 to 11:00 and 12:00 to 3:00  [address] [facility] 
[phone] Annmarie Giordano MD   [address] 
[phone] Discharge Orders None A check harjinder indicates which time of day the medication should be taken. My Medications START taking these medications Instructions Each Dose to Equal  
 Morning Noon Evening Bedtime  
 prenatal vit-iron fumarate-fa 28 mg iron- 800 mcg Tab Commonly known as:  PRENATAL PLUS with IRON Start taking on:  3/6/2018 Your last dose was: Today 9 am  
Your next dose is:  Tomorrow 9 am  
   
 Take 1 Tab by mouth daily. Indications: pregnancy 1 Tab  
    
  
   
   
   
  
 sertraline 25 mg tablet Commonly known as:  ZOLOFT Start taking on:  3/6/2018 Take 1 Tab by mouth daily. Indications: ANXIETY WITH DEPRESSION  
 25 mg Where to Get Your Medications Information on where to get these meds will be given to you by the nurse or doctor. ! Ask your nurse or doctor about these medications  
  prenatal vit-iron fumarate-fa 28 mg iron- 800 mcg Tab  
 sertraline 25 mg tablet Discharge Instructions DISCHARGE SUMMARY 
 
NAME:Emma Oquendo : 1990 MRN: [id number] The patient Pura Rao exhibits the ability to control behavior in a less restrictive environment. Patient's level of functioning is improving. No assaultive/destructive behavior has been observed for the past 24 hours. No suicidal/homicidal threat or behavior has been observed for the past 24 hours. There is no evidence of serious medication side effects. Patient has not been in physical or protective restraints for at least the past 24 hours. If weapons involved, how are they secured? No weapons involved Is patient aware of and in agreement with discharge plan? Patient is aware of discharge and is in agreement Arrangements for medication:  Prescriptions filled per keely . Referral for substance abuse treatment ? Yes, 3/6/2018 at 8:30 Referral for smoking cessation needed ? No  
 
Copy of discharge instructions to  provider?:  Yes, fax to [phone] Arrangements for transportation home:  Patient to arrange Keep all follow up appointments as scheduled, continue to take prescribed medications per physician instructions. Mental health crisis number:  538 or your local mental health crisis line number at 838-0600 DISCHARGE SUMMARY from Nurse PATIENT INSTRUCTIONS: 
What to do at Home: 
Recommended activity: Activity as tolerated. If you experience any of the following symptoms hopeless helpless overwhelming thoughts of harming self or others, please call 911 or your local mental health hotline [place] [phone], [address]. *  Please give a list of your current medications to your Primary Care Provider. *  Please update this list whenever your medications are discontinued, doses are 
    changed, or new medications (including over-the-counter products) are added. *  Please carry medication information at all times in case of emergency situations. These are general instructions for a healthy lifestyle: No smoking/ No tobacco products/ Avoid exposure to second hand smoke Surgeon General's Warning:  Quitting smoking now greatly reduces serious risk to your health. Obesity, smoking, and sedentary lifestyle greatly increases your risk for illness A healthy diet, regular physical exercise & weight monitoring are important for maintaining a healthy lifestyle You may be retaining fluid if you have a history of heart failure or if you experience any of the following symptoms:  Weight gain of 3 pounds or more overnight or 5 pounds in a week, increased swelling in our hands or feet or shortness of breath while lying flat in bed. Please call your doctor as soon as you notice any of these symptoms; do not wait until your next office visit. Recognize signs and symptoms of STROKE: 
 
F-face looks uneven A-arms unable to move or move unevenly S-speech slurred or non-existent T-time-call 911 as soon as signs and symptoms begin-DO NOT go Back to bed or wait to see if you get better-TIME IS BRAIN. Warning Signs of HEART ATTACK Call 911 if you have these symptoms: 
? Chest discomfort. Most heart attacks involve discomfort in the center of the chest that lasts more than a few minutes, or that goes away and comes back. It can feel like uncomfortable pressure, squeezing, fullness, or pain. ? Discomfort in other areas of the upper body. Symptoms can include pain or discomfort in one or both arms, the back, neck, jaw, or stomach. ? Shortness of breath with or without chest discomfort. ? Other signs may include breaking out in a cold sweat, nausea, or lightheadedness. Don't wait more than five minutes to call [address]! Fast action can save your life. Calling 911 is almost always the fastest way to get lifesaving treatment. Emergency Medical Services staff can begin treatment when they arrive  up to an hour sooner than if someone gets to the hospital by car. The discharge information has been reviewed with the patient.   The patient verbalized understanding. Discharge medications reviewed with the patient and appropriate educational materials and side effects teaching were provided. ___________________________________________________________________________________________________________________________________ MyChart Announcement We are excited to announce that we are making your provider's discharge notes available to you in Hark. You will see these notes when they are completed and signed by the physician that discharged you from your recent hospital stay. If you have any questions or concerns about any information you see in Hark, please call the Health Information Department where you were seen or reach out to your Primary Care Provider for more information about your plan of care. Introducing [facility]! [employer] introduces Hark patient portal. Now you can access parts of your medical record, email your doctor's office, and request medication refills online. 1. In your internet browser, go to [email or web address] 2. Click on the First Time User? Click Here link in the Sign In box. You will see the New Member Sign Up page. 3. Enter your Hark Access Code exactly as it appears below. You will not need to use this code after youve completed the sign-up process. If you do not sign up before the expiration date, you must request a new code. · Hark Access Code: [email or web address] Expires: 3/5/2018 10:19 PM 
 
4. Enter the last four digits of your Social Security Number (xxxx) and Date of Birth (mm/dd/yyyy) as indicated and click Submit. You will be taken to the next sign-up page. 5. Create a Hark ID. This will be your Hark login ID and cannot be changed, so think of one that is secure and easy to remember. 6. Create a Hark password. You can change your password at any time. 7. Enter your Password Reset Question and Answer.  This can be used at a later time if you forget your password. 8. Enter your e-mail address. You will receive e-mail notification when new information is available in [address] 9. Click Sign Up. You can now view and download portions of your medical record. 10. Click the Download Summary menu link to download a portable copy of your medical information. If you have questions, please visit the Frequently Asked Questions section of the [employer] website. Remember, [employer] is NOT to be used for urgent needs. For medical emergencies, dial 911. Now available from your iPhone and Android! Providers Seen During Your Hospitalization Provider Specialty Primary office phone Antonio Madison MD Psychiatry [phone] Your Primary Care Physician (PCP) Primary Care Physician Office Phone Office Fax Norberto Servin [phone] You are allergic to the following Allergen Reactions Other Food Anaphylaxis ONION Onion Anaphylaxis Recent Documentation Height Weight BMI OB Status Smoking Status 1.854 m 87 kg 25.29 kg/m2 Pregnant Current Every Day Smoker Emergency Contacts Name Discharge Info Relation Home Work Mobile Cate Sutherland CAREGIVER [3] Mother [14] [phone] Patient Belongings The following personal items are in your possession at time of discharge: 
  Dental Appliances: None  Visual Aid: None      Home Medications: None   Jewelry: Body Piercing, Other (comment)  Clothing: Jacket/Coat, Pants, Shirt, Shorts, Slippers, Undergarments    Other Valuables: Sent home (EBT card and Master card sent home with partner per patients) Please provide this summary of care documentation to your next provider. Signatures-by signing, you are acknowledging that this After Visit Summary has been reviewed with you and you have received a copy.   
  
 
  
    
    
 Patient Signature: ____________________________________________________________ Date:  ____________________________________________________________  
  
Lawerence Gio Provider Signature:  ____________________________________________________________ Date:  ____________________________________________________________

## 2018-02-27 NOTE — ED PROVIDER NOTES
EMERGENCY DEPARTMENT HISTORY AND PHYSICAL EXAM    Date: 2/27/2018  Patient Name: Jovanni Sevilla    History of Presenting Illness     Chief Complaint   Patient presents with    Mental Health Problem         History Provided By: Patient      Additional History (Context): Jovanni Sevilla is a 33 y.o. female with hypertension and afib who presents with paperless ECO escorted by [employer] after stating SI at outpatient [facility] facility today just pta. Pt denies pain at this time (no quality, severity, modifying factors). Pt states she was going to [facility] today for an outpatient drug abuse class. PT is under increased stress d/t two theft charges and a drug charge in which she has to go to court for soon. Pt endorses increased depression starting this AM. Hx of depression and seeing a psychiatrist \"years ago\" with no current follow up or medications. Pt was living on her own with her 3 children, but recently (>1 month ago) started living with her brother in his home. Denies any problems in the household. Pt denies SI at this time or plan. Denies HI, hallucinations, alcohol abuse, fever, chills, n/v, pain, recent illness. Endorses smoking marijuana daily since she was 9years old. No complaints at this time. Requesting food. PCP: Shelia David MD    Current Outpatient Prescriptions   Medication Sig Dispense Refill    triamcinolone-dimethicone 0.1-5 % ktoc 1 Inch by Apply Externally route two (2) times a day. 15 g 0    clindamycin (CLEOCIN) 300 mg capsule Take 1 Cap by mouth four (4) times daily. 40 Cap 0    HYDROcodone-acetaminophen (NORCO) 5-325 mg per tablet Take 1 Tab by mouth every four (4) hours as needed for Pain. Max Daily Amount: 6 Tabs. 6 Tab 0    traMADol (ULTRAM) 50 mg tablet Take 1 Tab by mouth every six (6) hours as needed for Pain. Max Daily Amount: 200 mg. 10 Tab 0    naproxen (NAPROSYN) 500 mg tablet Take 1 Tab by mouth two (2) times daily as needed.  20 Tab 0       Past History     Past Medical History:  Past Medical History:   Diagnosis Date    Hypertension     Other ill-defined conditions(799.89)     muscle spasms    Other ill-defined conditions(799.89)     a fib    Other ill-defined conditions(799.89)     angina       Past Surgical History:  Past Surgical History:   Procedure Laterality Date    HX ORTHOPAEDIC      left knee       Family History:  Family History   Problem Relation Age of Onset    Diabetes Mother     Hypertension Mother     Diabetes Maternal Grandfather     Hypertension Maternal Grandfather        Social History:  Social History   Substance Use Topics    Smoking status: Current Every Day Smoker     Packs/day: 1.50     Years: 0.50    Smokeless tobacco: Never Used    Alcohol use No      Comment: occassionally       Allergies: Allergies   Allergen Reactions    Onion Anaphylaxis         Review of Systems   Review of Systems   Constitutional: Negative for activity change, chills, fatigue and fever. HENT: Negative for congestion, drooling, ear discharge, ear pain, facial swelling, postnasal drip, rhinorrhea, sinus pressure, sneezing, sore throat and trouble swallowing. Eyes: Negative for photophobia, pain, discharge, redness, itching and visual disturbance. Respiratory: Negative for cough, chest tightness, shortness of breath, wheezing and stridor. Cardiovascular: Negative for chest pain. Gastrointestinal: Negative for abdominal pain, constipation, diarrhea, nausea and vomiting. Genitourinary: Negative. Musculoskeletal: Negative. Negative for myalgias and neck pain. Skin: Negative. Negative for rash. Allergic/Immunologic: Negative for environmental allergies. Neurological: Negative for dizziness, syncope, weakness, light-headedness and headaches. Psychiatric/Behavioral: Positive for dysphoric mood and suicidal ideas. Negative for agitation, behavioral problems, confusion, decreased concentration, hallucinations, self-injury and sleep disturbance.  The patient is not nervous/anxious. Physical Exam     Vitals:    02/27/18 1520 02/27/18 1839   BP: 136/82 113/53   Pulse: 74 63   Resp: 16 16   Temp: 98.5 °F (36.9 °C) 98.2 °F (36.8 °C)   SpO2: 100% 100%   Weight: 96.2 kg (212 lb)    Height: 6' 1\" (1.854 m)      Physical Exam   Constitutional: She is oriented to person, place, and time. She appears well-developed and well-nourished. No distress. Pt mildly tearful on exam.   HENT:   Head: Normocephalic and atraumatic. Right Ear: Hearing and external ear normal.   Left Ear: Hearing and external ear normal.   Nose: Nose normal.   Eyes: Conjunctivae and EOM are normal. Pupils are equal, round, and reactive to light. Neck: Normal range of motion. Cardiovascular: Normal rate, regular rhythm, normal heart sounds and intact distal pulses. Exam reveals no gallop and no friction rub. No murmur heard. Pulmonary/Chest: Effort normal and breath sounds normal. No respiratory distress. She has no wheezes. She has no rales. She exhibits no tenderness. Abdominal: Soft. There is no tenderness. Musculoskeletal: Normal range of motion. Neurological: She is alert and oriented to person, place, and time. She has normal strength. She is not disoriented. No cranial nerve deficit or sensory deficit. GCS eye subscore is 4. GCS verbal subscore is 5. GCS motor subscore is 6. Skin: Skin is warm and dry. She is not diaphoretic. Psychiatric: Her speech is normal and behavior is normal. Judgment normal. Her mood appears not anxious. Her affect is not angry, not blunt, not labile and not inappropriate. Thought content is not paranoid and not delusional. Cognition and memory are normal. She exhibits a depressed mood. She expresses no homicidal and no suicidal ideation. She expresses no suicidal plans and no homicidal plans. Nursing note and vitals reviewed.         Diagnostic Study Results     Labs -     Recent Results (from the past 12 hour(s))   ETHYL ALCOHOL Collection Time: 02/27/18  3:26 PM   Result Value Ref Range    ALCOHOL(ETHYL),SERUM <10 <10 MG/DL   CBC WITH AUTOMATED DIFF    Collection Time: 02/27/18  3:26 PM   Result Value Ref Range    WBC 10.3 3.6 - 11.0 K/uL    RBC 3.71 (L) 3.80 - 5.20 M/uL    HGB 11.6 11.5 - 16.0 g/dL    HCT 33.3 (L) 35.0 - 47.0 %    MCV 89.8 80.0 - 99.0 FL    MCH 31.3 26.0 - 34.0 PG    MCHC 34.8 30.0 - 36.5 g/dL    RDW 13.6 11.5 - 14.5 %    PLATELET 201 139 - 992 K/uL    MPV 9.7 8.9 - 12.9 FL    NRBC 0.0 0  WBC    ABSOLUTE NRBC 0.00 0.00 - 0.01 K/uL    NEUTROPHILS 55 32 - 75 %    LYMPHOCYTES 39 12 - 49 %    MONOCYTES 4 (L) 5 - 13 %    EOSINOPHILS 1 0 - 7 %    BASOPHILS 0 0 - 1 %    IMMATURE GRANULOCYTES 0 0.0 - 0.5 %    ABS. NEUTROPHILS 5.7 1.8 - 8.0 K/UL    ABS. LYMPHOCYTES 4.0 (H) 0.8 - 3.5 K/UL    ABS. MONOCYTES 0.4 0.0 - 1.0 K/UL    ABS. EOSINOPHILS 0.1 0.0 - 0.4 K/UL    ABS. BASOPHILS 0.0 0.0 - 0.1 K/UL    ABS. IMM.  GRANS. 0.0 0.00 - 0.04 K/UL    DF AUTOMATED     DRUG SCREEN, URINE    Collection Time: 02/27/18  3:26 PM   Result Value Ref Range    AMPHETAMINES NEGATIVE  NEG      BARBITURATES NEGATIVE  NEG      BENZODIAZEPINES NEGATIVE  NEG      COCAINE NEGATIVE  NEG      METHADONE NEGATIVE  NEG      OPIATES NEGATIVE  NEG      PCP(PHENCYCLIDINE) NEGATIVE  NEG      THC (TH-CANNABINOL) POSITIVE (A) NEG      Drug screen comment (NOTE)    URINALYSIS W/ REFLEX CULTURE    Collection Time: 02/27/18  3:26 PM   Result Value Ref Range    Color YELLOW/STRAW      Appearance CLEAR CLEAR      Specific gravity 1.025 1.003 - 1.030      pH (UA) 6.0 5.0 - 8.0      Protein NEGATIVE  NEG mg/dL    Glucose NEGATIVE  NEG mg/dL    Ketone NEGATIVE  NEG mg/dL    Bilirubin NEGATIVE  NEG      Blood NEGATIVE  NEG      Urobilinogen 1.0 0.2 - 1.0 EU/dL    Nitrites NEGATIVE  NEG      Leukocyte Esterase NEGATIVE  NEG      WBC 0-4 0 - 4 /hpf    RBC 0-5 0 - 5 /hpf    Epithelial cells FEW FEW /lpf    Bacteria NEGATIVE  NEG /hpf    UA:UC IF INDICATED CULTURE NOT INDICATED BY UA RESULT CNI      Mucus 2+ (A) NEG /lpf   METABOLIC PANEL, COMPREHENSIVE    Collection Time: 02/27/18  3:26 PM   Result Value Ref Range    Sodium 137 136 - 145 mmol/L    Potassium 4.0 3.5 - 5.1 mmol/L    Chloride 103 97 - 108 mmol/L    CO2 26 21 - 32 mmol/L    Anion gap 8 5 - 15 mmol/L    Glucose 68 65 - 100 mg/dL    BUN 5 (L) 6 - 20 MG/DL    Creatinine 0.61 0.55 - 1.02 MG/DL    BUN/Creatinine ratio 8 (L) 12 - 20      GFR est AA >60 >60 ml/min/1.73m2    GFR est non-AA >60 >60 ml/min/1.73m2    Calcium 9.3 8.5 - 10.1 MG/DL    Bilirubin, total 0.2 0.2 - 1.0 MG/DL    ALT (SGPT) 7 (L) 12 - 78 U/L    AST (SGOT) 10 (L) 15 - 37 U/L    Alk. phosphatase 106 45 - 117 U/L    Protein, total 7.6 6.4 - 8.2 g/dL    Albumin 3.2 (L) 3.5 - 5.0 g/dL    Globulin 4.4 (H) 2.0 - 4.0 g/dL    A-G Ratio 0.7 (L) 1.1 - 2.2     ACETAMINOPHEN    Collection Time: 02/27/18  3:26 PM   Result Value Ref Range    Acetaminophen level <2 (L) 10 - 30 ug/mL   SALICYLATE    Collection Time: 02/27/18  3:26 PM   Result Value Ref Range    Salicylate level 2.7 (L) 2.8 - 20.0 MG/DL   HCG URINE, QL. - POC    Collection Time: 02/27/18  3:40 PM   Result Value Ref Range    Pregnancy test,urine (POC) POSITIVE (A) NEG     EKG, 12 LEAD, INITIAL    Collection Time: 02/27/18  4:22 PM   Result Value Ref Range    Ventricular Rate 66 BPM    Atrial Rate 66 BPM    P-R Interval 156 ms    QRS Duration 78 ms    Q-T Interval 402 ms    QTC Calculation (Bezet) 421 ms    Calculated P Axis 31 degrees    Calculated R Axis 16 degrees    Calculated T Axis 39 degrees    Diagnosis       Normal sinus rhythm with sinus arrhythmia  Normal ECG  No previous ECGs available         Radiologic Studies -   No orders to display     CT Results  (Last 48 hours)    None        CXR Results  (Last 48 hours)    None            Medical Decision Making   I am the first provider for this patient.     I reviewed the vital signs, available nursing notes, past medical history, past surgical history, family history and social history. Vital Signs-Reviewed the patient's vital signs. Pulse Oximetry Analysis - 100% on RA    EKG: Interpreted by the EP. Carolin Shafer MD.   Time Interpreted: 16:27   Rate: 66bpm   Rhythm: normal sinus rhythm w/ sinus arrhythmia   Interpretation: normal ekg   Comparison: no previous comparison    Records Reviewed: Nursing Notes and Old Medical Records    ED Course:   3:35 PM  Pt was with Crisis counselor at [address] [place] [address]. Crisis has been contacted and she will establish bed placement once pt is medically cleared. 5:19 PM  [facility] has been notified that the pt is medically cleared. They have petitioned for a TDO and are waiting for bed assignment on the third floor [facility].    7:39 PM  Pt now to be transported to [facility] for [facility] b/c [address] [place] [address] Unit cannot accept pregnant pts. Disposition:  TRANSFER NOTE:  7:46 PM  Pt is being transferred to [address] [place] [address] Unit at [facility], transfer accepted by Dr. Chelo Waterman. The reasons for pt's transfer have been discussed with the pt and available family. They convey agreement and understanding for the need to be transferred as explained to them by myself. Follow-up Information     None          Current Discharge Medication List          Provider Notes (Medical Decision Making):   DDx: SI, depression, electrolyte abnormality, dehydration, substance induced mood disorder, alcohol/ drug intoxication, psychosis    Procedures:  Procedures    Diagnosis     Clinical Impression:   1. Pregnancy test performed, pregnancy confirmed    2. Suicidal ideation    3.  Marijuana abuse

## 2018-02-27 NOTE — ED NOTES
Patient brought under ECO from [facility] after expressing suicidal ideation at an outpatient drug abuse class. Patient states she had suicidal thoughts this morning, denies plan. Patient states \"just anything to slow me down because I didn't want the day to start\". \"I've just got a lot going on anyways. \"  States she lives with her three children at her brother's house. Patient reports dealing with two recent deaths, dealing with stress from a recent court date. Patient states smoking since she was 11. Denies SI and HI at this time, states \"I'm just hungry\". Emergency Department Nursing Plan of Care       The Nursing Plan of Care is developed from the Nursing assessment and Emergency Department Attending provider initial evaluation. The plan of care may be reviewed in the ED Provider note.     The Plan of Care was developed with the following considerations:   Patient / Family readiness to learn indicated by:verbalized understanding  Persons(s) to be included in education: patient  Barriers to Learning/Limitations:No    Signed     Misael Wilburn RN    2/27/2018   3:20 PM

## 2018-02-27 NOTE — ED TRIAGE NOTES
Pt arrives in custody with [employer] from [facility] on paperless ECO. Patient was cooperative, alert, and follows commands.

## 2018-02-28 PROBLEM — I10 HYPERTENSION: Status: ACTIVE | Noted: 2018-02-28

## 2018-02-28 LAB
ANION GAP SERPL CALC-SCNC: 5 MMOL/L (ref 5–15)
ATRIAL RATE: 66 BPM
BUN SERPL-MCNC: 6 MG/DL (ref 6–20)
BUN/CREAT SERPL: 10 (ref 12–20)
CALCIUM SERPL-MCNC: 8.8 MG/DL (ref 8.5–10.1)
CALCULATED P AXIS, ECG09: 31 DEGREES
CALCULATED R AXIS, ECG10: 16 DEGREES
CALCULATED T AXIS, ECG11: 39 DEGREES
CHLORIDE SERPL-SCNC: 107 MMOL/L (ref 97–108)
CO2 SERPL-SCNC: 25 MMOL/L (ref 21–32)
CREAT SERPL-MCNC: 0.61 MG/DL (ref 0.55–1.02)
DIAGNOSIS, 93000: NORMAL
GLUCOSE SERPL-MCNC: 73 MG/DL (ref 65–100)
HCG SERPL-ACNC: ABNORMAL MIU/ML (ref 0–6)
P-R INTERVAL, ECG05: 156 MS
POTASSIUM SERPL-SCNC: 4 MMOL/L (ref 3.5–5.1)
Q-T INTERVAL, ECG07: 402 MS
QRS DURATION, ECG06: 78 MS
QTC CALCULATION (BEZET), ECG08: 421 MS
SODIUM SERPL-SCNC: 137 MMOL/L (ref 136–145)
TSH SERPL DL<=0.05 MIU/L-ACNC: 0.29 UIU/ML (ref 0.36–3.74)
VENTRICULAR RATE, ECG03: 66 BPM

## 2018-02-28 PROCEDURE — 84443 ASSAY THYROID STIM HORMONE: CPT | Performed by: PSYCHIATRY & NEUROLOGY

## 2018-02-28 PROCEDURE — 65220000003 HC RM SEMIPRIVATE PSYCH

## 2018-02-28 PROCEDURE — 80048 BASIC METABOLIC PNL TOTAL CA: CPT | Performed by: FAMILY MEDICINE

## 2018-02-28 PROCEDURE — 84702 CHORIONIC GONADOTROPIN TEST: CPT | Performed by: HOSPITALIST

## 2018-02-28 PROCEDURE — 74011250637 HC RX REV CODE- 250/637: Performed by: HOSPITALIST

## 2018-02-28 PROCEDURE — 36415 COLL VENOUS BLD VENIPUNCTURE: CPT | Performed by: PSYCHIATRY & NEUROLOGY

## 2018-02-28 RX ORDER — CALCIUM CARBONATE 200(500)MG
200 TABLET,CHEWABLE ORAL
Status: DISCONTINUED | OUTPATIENT
Start: 2018-02-28 | End: 2018-03-05 | Stop reason: HOSPADM

## 2018-02-28 RX ORDER — SWAB
1 SWAB, NON-MEDICATED MISCELLANEOUS DAILY
Status: DISCONTINUED | OUTPATIENT
Start: 2018-03-01 | End: 2018-03-05 | Stop reason: HOSPADM

## 2018-02-28 RX ORDER — ONDANSETRON 4 MG/1
4 TABLET, ORALLY DISINTEGRATING ORAL
Status: DISCONTINUED | OUTPATIENT
Start: 2018-02-28 | End: 2018-03-05 | Stop reason: HOSPADM

## 2018-02-28 RX ORDER — CLONIDINE HYDROCHLORIDE 0.1 MG/1
0.1 TABLET ORAL
Status: DISCONTINUED | OUTPATIENT
Start: 2018-02-28 | End: 2018-03-05 | Stop reason: HOSPADM

## 2018-02-28 RX ORDER — PROMETHAZINE HYDROCHLORIDE 25 MG/1
25 TABLET ORAL
Status: DISCONTINUED | OUTPATIENT
Start: 2018-02-28 | End: 2018-02-28

## 2018-02-28 RX ADMIN — PROMETHAZINE HYDROCHLORIDE 25 MG: 25 TABLET ORAL at 17:34

## 2018-02-28 NOTE — INTERDISCIPLINARY ROUNDS
Behavioral Health Interdisciplinary Rounds     Patient Name: Conor Barbour  Age: 33 y.o. Room/Bed:  726/02  Primary Diagnosis: <principal problem not specified>   Admission Status: TDO     Readmission within 30 days: no  Power of  in place: no  Patient requires a blocked bed: no          Reason for blocked bed:     VTE Prophylaxis: No  Flu vaccine given : no   Mobility needs/Fall risk: no    Nutritional Plan: no  Consults:          Labs/Testing due today?: yes    Sleep hours:  7.0      Participation in Care/Groups:  no  Medication Compliant?: No medications scheduled at this time  PRNS (last 24 hours): None    Restraints (last 24 hours):  no  Substance Abuse:  yes  CIWA (range last 24 hours):  COWS (range last 24 hours):   Alcohol screening (AUDIT) completed -  AUDIT Score: 0  If applicable, date SBIRT discussed in treatment team AND documented:   Tobacco - patient is a smoker:    Date tobacco education completed by RN:   24 hour chart check complete: yes     Patient goal(s) for today:   Treatment team focus/goals: Plan to set up her hearing and assess for medications and discharge needs.    Progress note She was pleasant and compliant in treatment team.      LOS:  1  Expected LOS: TBD     Financial concerns/prescription coverage:  no  Date of last family contact:       Family requesting physician contact today:  no  Discharge plan: she will return home   Guns in the home:  no       Outpatient provider(s): [facility]     Participating treatment team members: Tomas Dee

## 2018-02-28 NOTE — PROGRESS NOTES
Problem: Falls - Risk of  Goal: *Absence of Falls  Document Jaycee Fall Risk and appropriate interventions in the flowsheet. Outcome: Progressing Towards Goal  Fall Risk Interventions:  New patient this evening shift. She states she has numerous stressors in her life. She has three children under 3 and is currently 3 months pregnant. She has been safe on the unit since arrival.  She is currently sleeping, no stress noted.

## 2018-02-28 NOTE — ED NOTES
Bedside and Verbal shift change report given to JEREMY RN  (oncoming nurse) by Josh Weaver RN  (offgoing nurse). Report included the following information SBAR, Kardex, ED Summary, Intake/Output, MAR and Recent Results.

## 2018-02-28 NOTE — PROGRESS NOTES
Problem: Depressed Mood (Adult/Pediatric)  Goal: *STG: Participates in treatment plan  Outcome: Not Progressing Towards Goal  Variance: Patient slowly responding    Pt refuses Calcium, agrees to stay at here TDO hearing.

## 2018-02-28 NOTE — PROGRESS NOTES
Admission Medication Reconciliation:    Information obtained from:  Communication with Delonte Orellana ([phone]) and review of Rx Query data and [facility]     Comments/Recommendations: Updated PTA meds/reviewed patient's allergies. 1)  Removed:       - clindamycin       - hydrocodone/APAP       - naproxen       - tramadol       - triamcinolone/dimethicone    2)  [facility] ([place]) has not filled scripts for this patient since 6/2017.    3)  The [facility] GOWEX Program ([facility]) was assessed to determine fill history of any controlled medications. The patient has only filled hydrocodone/APAP 5/325 #6 once in the past 12 months (6/2017)    However, per chart review, patient has history of opioid use and has active/pending narcotic charges. Significant PMH/Disease States:   Past Medical History:   Diagnosis Date    Hypertension     Other ill-defined conditions(799.89)     muscle spasms    Other ill-defined conditions(799.89)     a fib    Other ill-defined conditions(799.89)     angina     Chief Complaint for this Admission:  No chief complaint on file.     Allergies:  Onion    Prior to Admission Medications:   None     Ingrid Jama, PharmD, BCPP, [facility] Specialist, [address] [place] [address]

## 2018-02-28 NOTE — BH NOTES
[facility] triage hospitalist for H & P, awaiting return call back from Dr. Alyssa Nice    [id number] received telephone call back from Dr. Jarred Tijerina.  Pt would be added to H & P list.

## 2018-02-28 NOTE — ED NOTES
Pt resting in bed w/ eyes closed and lights out for comfort, pt appears to be in no distress, will continue to monitor pt and resume SI checks.

## 2018-02-28 NOTE — BH NOTES
PRN Medication Documentation  2017  Specific patient behavior that led to need for PRN medication: c/o nausea   Staff interventions attempted prior to PRN being given: none   PRN medication given: phenergan po  Patient response/effectiveness of PRN medication: will determine

## 2018-02-28 NOTE — H&P
INITIAL PSYCHIATRIC EVALUATION            IDENTIFICATION:    Patient Name  Llewellyn Landau   Date of Birth 1990   [facility] [id number]   Medical Record Number  [id number]      Age  33 y.o. PCP Marylynn Simmonds, MD   Admit date:  2/27/2018    Room Number  726/02  @ [facility]   Date of Service  2/28/2018            HISTORY         REASON FOR HOSPITALIZATION:  CC: \"SI, no plan or intent \". Pt admitted under a temporary custodial order (TDO) severe depression with suicidal ideations  proving to be an imminent danger to self and others and an inability to care for self. HISTORY OF PRESENT ILLNESS:    The patient, Llewellyn Landau, is a 33 y.o. BLACK OR  female with a past psychiatric history significant for dubstance induced mood d/o , who presents at this time with complaints of (and/or evidence of) the following emotional symptoms: depression and suicidal thoughts/threats. Additional symptomatology include anxiety. The above symptoms have been present for years. These symptoms are of severe severity. These symptoms are constant  in nature. The patient's condition has been precipitated by ambivalence about current pregnancy and psychosocial stressors (upcoming court date ). Patient's condition made worse by continued illicit drug use as well as treatment noncompliance. UDS: +MJ ; BAL=0. ALLERGIES:   Allergies   Allergen Reactions    Other Food Anaphylaxis     ONION    Onion Anaphylaxis      MEDICATIONS PRIOR TO ADMISSION:   No prescriptions prior to admission.       PAST MEDICAL HISTORY:   Past Medical History:   Diagnosis Date    Hypertension     Other ill-defined conditions(799.89)     muscle spasms    Other ill-defined conditions(799.89)     a fib    Other ill-defined conditions(799.89)     angina     Past Surgical History:   Procedure Laterality Date    HX ORTHOPAEDIC      left knee      SOCIAL HISTORY:    Social History     Social History    Marital status: SINGLE     Spouse name: N/A    Number of children: N/A    Years of education: N/A     Occupational History    Not on file. Social History Main Topics    Smoking status: Current Every Day Smoker     Packs/day: 1.50     Years: 0.50    Smokeless tobacco: Never Used    Alcohol use No      Comment: occassionally    Drug use: Yes     Special: Marijuana    Sexual activity: Yes     Partners: Male     Birth control/ protection: None     Other Topics Concern    Not on file     Social History Narrative    33year old single AA female  (currently 3.5 months preganant). Pt reported SI- dueing her court mandated substance abuse treatment session. She was TDO'd. Pt has a hx of THC use DAILY since age 6. She has 3 children- ages 1,2, and 3. Pt completed the 9th grade. She is on probations (with a future court date for another offense) for possession of controlled substances. FAMILY HISTORY:    Family History   Problem Relation Age of Onset    Diabetes Mother     Hypertension Mother     Diabetes Maternal Grandfather     Hypertension Maternal Grandfather        REVIEW OF SYSTEMS:   Psychological ROS: positive for - behavioral disorder  Respiratory ROS: no cough, shortness of breath, or wheezing  Cardiovascular ROS: no chest pain or dyspnea on exertion  Pertinent items are noted in the History of Present Illness. All other Systems reviewed and are considered negative. MENTAL STATUS EXAM & VITALS     MENTAL STATUS EXAM (MSE):    MSE FINDINGS ARE WITHIN NORMAL LIMITS (WNL) UNLESS OTHERWISE STATED BELOW. ( ALL OF THE BELOW CATEGORIES OF THE MSE HAVE BEEN REVIEWED (reviewed 2018) AND UPDATED AS DEEMED APPROPRIATE )  General Presentation age appropriate, evasive and guarded   Orientation oriented to time, place and person   Vital Signs  See below (reviewed 2018); Vital Signs (BP, Pulse, & Temp) are within normal limits if not listed below.    Gait and Station Stable/steady, no ataxia Musculoskeletal System No extrapyramidal symptoms (EPS); no abnormal muscular movements or Tardive Dyskinesia (TD); muscle strength and tone are within normal limits   Language No aphasia or dysarthria   Speech:  hypoverbal   Thought Processes concrete slow rate of thoughts; poor abstract reasoning/computation   Thought Associations goal directed   Thought Content free of delusions   Suicidal Ideations none   Homicidal Ideations none   Mood:  irritable   Affect:  stable   Memory recent  fair   Memory remote:  fair   Concentration/Attention:  distractable   Fund of Knowledge significantly below average   Insight:  poor   Reliability poor   Judgment:  poor          VITALS:     Patient Vitals for the past 24 hrs:   Temp Pulse Resp BP SpO2   02/28/18 1123 98.1 °F (36.7 °C) 87 16 127/82 100 %   02/28/18 0730 97.7 °F (36.5 °C) 64 16 119/77 100 %   02/27/18 2105 - - 16 154/89 -     Wt Readings from Last 3 Encounters:   02/27/18 97.1 kg (214 lb)   02/27/18 96.2 kg (212 lb)   12/05/17 95.3 kg (210 lb)     Temp Readings from Last 3 Encounters:   02/28/18 98.1 °F (36.7 °C)   02/27/18 98.2 °F (36.8 °C)   12/05/17 98.8 °F (37.1 °C)     BP Readings from Last 3 Encounters:   02/28/18 127/82   02/27/18 113/53   12/05/17 (!) 170/131     Pulse Readings from Last 3 Encounters:   02/28/18 87   02/27/18 63   12/05/17 84            DATA     LABORATORY DATA:  Labs Reviewed   TSH 3RD GENERATION - Abnormal; Notable for the following:        Result Value    TSH 0.29 (*)     All other components within normal limits   METABOLIC PANEL, BASIC - Abnormal; Notable for the following:     BUN/Creatinine ratio 10 (*)     All other components within normal limits   BETA HCG, QT - Abnormal; Notable for the following:     [place] [address] (*)     All other components within normal limits     Admission on 02/27/2018   Component Date Value Ref Range Status    TSH 02/28/2018 0.29* 0.36 - 3.74 uIU/mL Final    Sodium 02/28/2018 137  136 - 145 mmol/L Final    Potassium 02/28/2018 4.0  3.5 - 5.1 mmol/L Final    Chloride 02/28/2018 107  97 - 108 mmol/L Final    CO2 02/28/2018 25  21 - 32 mmol/L Final    Anion gap 02/28/2018 5  5 - 15 mmol/L Final    Glucose 02/28/2018 73  65 - 100 mg/dL Final    BUN 02/28/2018 6  6 - 20 MG/DL Final    Creatinine 02/28/2018 0.61  0.55 - 1.02 MG/DL Final    BUN/Creatinine ratio 02/28/2018 10* 12 - 20   Final    GFR est AA 02/28/2018 >60  >60 ml/min/1.73m2 Final    GFR est non-AA 02/28/2018 >60  >60 ml/min/1.73m2 Final    Calcium 02/28/2018 8.8  8.5 - 10.1 MG/DL Final    Beta HCG, QT 02/28/2018 70741* 0 - 6 MIU/ML Final   Admission on 02/27/2018, Discharged on 02/27/2018   Component Date Value Ref Range Status    ALCOHOL(ETHYL),SERUM 02/27/2018 <10  <10 MG/DL Final    WBC 02/27/2018 10.3  3.6 - 11.0 K/uL Final    RBC 02/27/2018 3.71* 3.80 - 5.20 M/uL Final    HGB 02/27/2018 11.6  11.5 - 16.0 g/dL Final    HCT 02/27/2018 33.3* 35.0 - 47.0 % Final    MCV 02/27/2018 89.8  80.0 - 99.0 FL Final    MCH 02/27/2018 31.3  26.0 - 34.0 PG Final    MCHC 02/27/2018 34.8  30.0 - 36.5 g/dL Final    RDW 02/27/2018 13.6  11.5 - 14.5 % Final    PLATELET 53/95/4980 458  150 - 400 K/uL Final    MPV 02/27/2018 9.7  8.9 - 12.9 FL Final    NRBC 02/27/2018 0.0  0  WBC Final    ABSOLUTE NRBC 02/27/2018 0.00  0.00 - 0.01 K/uL Final    NEUTROPHILS 02/27/2018 55  32 - 75 % Final    LYMPHOCYTES 02/27/2018 39  12 - 49 % Final    MONOCYTES 02/27/2018 4* 5 - 13 % Final    EOSINOPHILS 02/27/2018 1  0 - 7 % Final    BASOPHILS 02/27/2018 0  0 - 1 % Final    IMMATURE GRANULOCYTES 02/27/2018 0  0.0 - 0.5 % Final    ABS. NEUTROPHILS 02/27/2018 5.7  1.8 - 8.0 K/UL Final    ABS. LYMPHOCYTES 02/27/2018 4.0* 0.8 - 3.5 K/UL Final    ABS. MONOCYTES 02/27/2018 0.4  0.0 - 1.0 K/UL Final    ABS. EOSINOPHILS 02/27/2018 0.1  0.0 - 0.4 K/UL Final    ABS. BASOPHILS 02/27/2018 0.0  0.0 - 0.1 K/UL Final    ABS. IMM.  GRANS. 02/27/2018 0.0 0.00 - 0.04 K/UL Final    DF 02/27/2018 AUTOMATED    Final    AMPHETAMINES 02/27/2018 NEGATIVE   NEG   Final    BARBITURATES 02/27/2018 NEGATIVE   NEG   Final    BENZODIAZEPINES 02/27/2018 NEGATIVE   NEG   Final    COCAINE 02/27/2018 NEGATIVE   NEG   Final    METHADONE 02/27/2018 NEGATIVE   NEG   Final    OPIATES 02/27/2018 NEGATIVE   NEG   Final    PCP(PHENCYCLIDINE) 02/27/2018 NEGATIVE   NEG   Final    THC (TH-CANNABINOL) 02/27/2018 POSITIVE* NEG   Final    Drug screen comment 02/27/2018 (NOTE)   Final    Color 02/27/2018 YELLOW/STRAW    Final    Appearance 02/27/2018 CLEAR  CLEAR   Final    Specific gravity 02/27/2018 1.025  1.003 - 1.030   Final    pH (UA) 02/27/2018 6.0  5.0 - 8.0   Final    Protein 02/27/2018 NEGATIVE   NEG mg/dL Final    Glucose 02/27/2018 NEGATIVE   NEG mg/dL Final    Ketone 02/27/2018 NEGATIVE   NEG mg/dL Final    Bilirubin 02/27/2018 NEGATIVE   NEG   Final    Blood 02/27/2018 NEGATIVE   NEG   Final    Urobilinogen 02/27/2018 1.0  0.2 - 1.0 EU/dL Final    Nitrites 02/27/2018 NEGATIVE   NEG   Final    Leukocyte Esterase 02/27/2018 NEGATIVE   NEG   Final    WBC 02/27/2018 0-4  0 - 4 /hpf Final    RBC 02/27/2018 0-5  0 - 5 /hpf Final    Epithelial cells 02/27/2018 FEW  FEW /lpf Final    Bacteria 02/27/2018 NEGATIVE   NEG /hpf Final    UA:UC IF INDICATED 02/27/2018 CULTURE NOT INDICATED BY UA RESULT  CNI   Final    Mucus 02/27/2018 2+* NEG /lpf Final    Sodium 02/27/2018 137  136 - 145 mmol/L Final    Potassium 02/27/2018 4.0  3.5 - 5.1 mmol/L Final    Chloride 02/27/2018 103  97 - 108 mmol/L Final    CO2 02/27/2018 26  21 - 32 mmol/L Final    Anion gap 02/27/2018 8  5 - 15 mmol/L Final    Glucose 02/27/2018 68  65 - 100 mg/dL Final    BUN 02/27/2018 5* 6 - 20 MG/DL Final    Creatinine 02/27/2018 0.61  0.55 - 1.02 MG/DL Final    BUN/Creatinine ratio 02/27/2018 8* 12 - 20   Final    GFR est AA 02/27/2018 >60  >60 ml/min/1.73m2 Final    GFR est non-AA 02/27/2018 >60  >60 ml/min/1.73m2 Final    Calcium 02/27/2018 9.3  8.5 - 10.1 MG/DL Final    Bilirubin, total 02/27/2018 0.2  0.2 - 1.0 MG/DL Final    ALT (SGPT) 02/27/2018 7* 12 - 78 U/L Final    AST (SGOT) 02/27/2018 10* 15 - 37 U/L Final    Alk. phosphatase 02/27/2018 106  45 - 117 U/L Final    Protein, total 02/27/2018 7.6  6.4 - 8.2 g/dL Final    Albumin 02/27/2018 3.2* 3.5 - 5.0 g/dL Final    Globulin 02/27/2018 4.4* 2.0 - 4.0 g/dL Final    A-G Ratio 02/27/2018 0.7* 1.1 - 2.2   Final    Acetaminophen level 02/27/2018 <2* 10 - 30 ug/mL Final    Salicylate level 70/06/1357 2.7* 2.8 - 20.0 MG/DL Final    Ventricular Rate 02/27/2018 66  BPM Final    Atrial Rate 02/27/2018 66  BPM Final    P-R Interval 02/27/2018 156  ms Final    QRS Duration 02/27/2018 78  ms Final    Q-T Interval 02/27/2018 402  ms Final    QTC Calculation (Bezet) 02/27/2018 421  ms Final    Calculated P Axis 02/27/2018 31  degrees Final    Calculated R Axis 02/27/2018 16  degrees Final    Calculated T Axis 02/27/2018 39  degrees Final    Diagnosis 02/27/2018    Final                    Value:Normal sinus rhythm with sinus arrhythmia  Normal ECG  No previous ECGs available  Confirmed by Angelito Barry MD, [facility] ([id number]) on 2/28/2018 10:00:30 AM      Pregnancy test,urine (POC) 02/27/2018 POSITIVE* NEG   Final        RADIOLOGY REPORTS:    Results from Hospital Encounter encounter on 01/20/14   XR KNEE LT 3 V   Narrative **Final Report**      ICD Codes / Adm. Diagnosis: 854206  [id number] / Fall  Dental Pain  Examination:  CR KNEE 3 VWS LT  - 3660100 - Jan 20 2014  2:49PM  Accession No:  [id number]  Reason:  Trauma      REPORT:  INDICATION:  Fall    Exam: 3 views left knee. No comparisons . FINDINGS: Alignment is normal. Bone mineral density is normal. No evidence   of acute fracture or bony destructive change. No effusion. IMPRESSION:  1.  No acute bony abnormality           Signing/Reading Doctor: Paul Haney ([id number]) Nathaly Gaxiola ([id number])  Jan 20 2014  2:58PM                               No results found. MEDICATIONS       ALL MEDICATIONS  Current Facility-Administered Medications   Medication Dose Route Frequency    cloNIDine HCl (CATAPRES) tablet 0.1 mg  0.1 mg Oral Q8H PRN    promethazine (PHENERGAN) tablet 25 mg  25 mg Oral Q6H PRN    calcium carbonate (TUMS) chewable tablet 200 mg [elemental]  200 mg Oral TID WITH MEALS    ziprasidone (GEODON) 20 mg in sterile water (preservative free) 1 mL injection  20 mg IntraMUSCular BID PRN    OLANZapine (ZyPREXA) tablet 5 mg  5 mg Oral Q6H PRN    benztropine (COGENTIN) tablet 2 mg  2 mg Oral BID PRN    benztropine (COGENTIN) injection 2 mg  2 mg IntraMUSCular BID PRN    acetaminophen (TYLENOL) tablet 650 mg  650 mg Oral Q4H PRN    magnesium hydroxide (MILK OF MAGNESIA) 400 mg/5 mL oral suspension 30 mL  30 mL Oral DAILY PRN    nicotine (NICODERM CQ) 21 mg/24 hr patch 1 Patch  1 Patch TransDERmal DAILY PRN      SCHEDULED MEDICATIONS  Current Facility-Administered Medications   Medication Dose Route Frequency    calcium carbonate (TUMS) chewable tablet 200 mg [elemental]  200 mg Oral TID WITH MEALS                ASSESSMENT & PLAN        The patient, Jovanni Sevilla, is a 33 y.o.  female who presents at this time for treatment of the following diagnoses:  Patient Active Hospital Problem List:   Depression (2/27/2018)    Assessment: sadness, hopelessness, helplessness, SI- no intent or plan - Likely due to [facility] daily use since age 6    Plan: Detox. 12 steps                 A coordinated, multidisplinary treatment team (includes the nurse, unit pharmcist,  and writer) round was conducted for this initial evaluation with the patient present. The following regarding medications was addressed during rounds with patient: calcium carbonate  the risks and benefits of the proposed medication.  The patient was given the opportunity to ask questions. Informed consent given to the use of the above medications. I will continue to adjust psychiatric and non-psychiatric medications (see above \"medication\" section and orders section for details) as deemed appropriate & based upon diagnoses and response to treatment. I have reviewed admission (and previous/old) labs and medical tests in the EHR and or transferring hospital documents. I will continue to order blood tests/labs and diagnostic tests as deemed appropriate and review results as they become available (see orders for details). I have reviewed old psychiatric and medical records available in the EHR. I Will order additional psychiatric records from other institutions to further elucidate the nature of patient's psychopathology and review once available. I will gather additional collateral information from friends, family and o/p treatment team to further elucidate the nature of patient's psychopathology and baselline level of psychiatric functioning.       ESTIMATED LENGTH OF STAY:    2-4 days        STRENGTHS:  Exercising self-direction/Resourceful, Access to housing/residential stability and Awareness of Substance abuse issues                                        SIGNED:    Carlos Sanchez MD  2/28/2018

## 2018-02-28 NOTE — PROGRESS NOTES
1430- writer spoke with Marissa RN Charge Nurse L&D at [phone]. Reason: one time order Fetal Heart Tone. L&D Nurse will see pt for Fetal Heart Tone now.

## 2018-02-28 NOTE — BH NOTES
TRANSFER - IN REPORT:    Verbal report received from Bernardino on [phone]  being received from [facility] for routine progression of care      Report consisted of patients Situation, Background, Assessment and   Recommendations(SBAR). Information from the following report(s) SBAR was reviewed with the receiving nurse. Opportunity for questions and clarification was provided. Assessment completed upon patients arrival to unit and care assumed.

## 2018-02-28 NOTE — PROGRESS NOTES
Problem: Depressed Mood (Adult/Pediatric)  Goal met by 3/12/2018  Goal: *STG: Participates in treatment plan  Variance: Patient Condition  Pt. Met in treatment team with Dr. Ophelia Hall sad and suicidal since she was 6years old    Pt. Myles WILLIAMSON  Hearing scheduled for today  Goal: *STG: Attends activities and groups  Variance: Patient Condition  Pt. Isolating in her room  Goal: *STG: Remains safe in hospital  Outcome: Progressing Towards Goal  Pt. Remains safe   In the hospital  Denies suicidal ideation in the hospital  Goal: Interventions  Variance: Patient Condition  Will continue to monitor  On 15 min. Checks  For safety  Assess depression   Medication compliance   Effectiveness  Encourage group participation    Problem: Falls - Risk of  Goal: *Absence of Falls  Document Jaycee Fall Risk and appropriate interventions in the flowsheet.    Outcome: Progressing Towards Goal  Fall Risk Interventions:     Non slip socks   Bed in low position         [address]        Date Treatment Plan Initiated:  2/28/2018      Treatment Plan Modalities:    Type of Modality Amount  (x minutes) Frequency (x/week) Duration (x days) Name of Responsible Staff   Community & wrap-up meetings to encourage peer interactions    15    7    1      Jj He, [address] [facility] psychotherapy to assist in building coping skills and internal controls    60    7    1    Royer Davis LCSW   Therapeutic activity groups to build coping skills    60    7    1    Royer Davis LCSW   Psychoeducation in group setting to address:   Medication education    15    7    1     Terri Kraus  RN   Coping skills    20    7    1     Sean Daniel RN   Relaxation techniques           Symptom management           Discharge planning    15    7    1     Zeinab Duarte    Spirituality     60    7    1    Chaplain Demarcus GUTIÉRREZ    60    7    1    Volunteer from [facility]   Recovery/AA/NA    60    7    1    Volunteer from [address] [place] [address] medication management    15    7    1    Dr. José Miguel Newby

## 2018-02-28 NOTE — H&P
History & Physical    Date of admission: 2/27/2018    Patient name: Alexandria Estrella  MRN: [id number]  YOB: 1990  Age: 33 y.o. Primary care provider:  Leticia Magaña MD     Source of Information: patient, and medical records                                 Chief complaint:  Patient does not provide    History of present illness  Alexandria Estrella is a 33 y.o. female who presented for admission with diagnosis of depression. This morning, patient is a very limited historian. As such, majority of history was obtained per my review of patient's medical records. Per these collective reports, patient was initially brought to the ED with paperless ECO escorted by [employer] after stating SI (suicidal ideation) at outpatient [facility] facility today just pta. Patient reportedly denied pain at this time (no quality, severity, modifying factors). Patient stated that she was going to [facility] today for an outpatient drug abuse class. Patient reportedly was under increased stress due to two theft charges and a drug charge in which she has to go to court for soon. Patient endorsed increased depression starting this AM.   Patient has a history of depression and seeing a psychiatrist \"years ago\" with no current follow up or medications. Patient was living on her own with her 3 children, but recently (>1 month ago) started living with her brother in his home. Patient reportedly denied having any problems in the household. Patient reportedly has been smoking marijuana daily since she was 9years old. Patient was medically cleared per the ED and then she was transferred to [facility] behavioral health unit for further evaluation and treatments.       PAST MEDICAL HISTORY:  Past Medical History:   Diagnosis Date    Hypertension     Other ill-defined conditions(369.89)     muscle spasms    Other ill-defined conditions(799.89)     a fib    Other ill-defined conditions(799.89)     angina      PAST SURGICAL HISTORY:  Past Surgical History:   Procedure Laterality Date    HX ORTHOPAEDIC      left knee     MEDICATIONS:  Prior to Admission medications    Medication Sig Start Date End Date Taking? Authorizing Provider   triamcinolone-dimethicone 0.1-5 % ktoc 1 Inch by Apply Externally route two (2) times a day. 12/5/17   Ann Carrillo MD   clindamycin (CLEOCIN) 300 mg capsule Take 1 Cap by mouth four (4) times daily. 6/19/17   Ivelisse Last MD   HYDROcodone-acetaminophen [facility]) 5-325 mg per tablet Take 1 Tab by mouth every four (4) hours as needed for Pain. Max Daily Amount: 6 Tabs. 6/19/17   Ivelisse Last MD   traMADol [facility] [place]) 50 mg tablet Take 1 Tab by mouth every six (6) hours as needed for Pain. Max Daily Amount: 200 mg. 5/10/17   Napoleon Wells PA-C   naproxen (NAPROSYN) 500 mg tablet Take 1 Tab by mouth two (2) times daily as needed. 5/10/17   Napoleon Wells PA-C     ALLERGIES:  Allergies   Allergen Reactions    Onion Anaphylaxis      FAMILY HISTORY:  Family History   Problem Relation Age of Onset    Diabetes Mother     Hypertension Mother     Diabetes Maternal Grandfather     Hypertension Maternal Grandfather            Social history  Patient resides  x  Independently                Ambulates  x  Independently                 Alcohol history   x  patient denies           Smoking history          x  Current smoker     History   Smoking Status    Current Every Day Smoker    Packs/day: 1.50    Years: 0.50   Smokeless Tobacco    Never Used     Illegal drugs:   Marijuana     Code status  x  Full code     Review of systems  I performed a comprehensive 12 systems review; pertinent positives were as noted in HPI, otherwise negative.     Physical Examination   Visit Vitals    /89    Resp 16    Ht 6' 1\" (1.854 m)    Wt 97.1 kg (214 lb)    LMP 11/14/2017 (Within Days)    BMI 28.23 kg/m2               General:  Overweight female in no acute respiratory distress   Head:  Normocephalic, without obvious abnormality, atraumatic   Eyes:  Conjunctivae/corneas clear. PERRL, EOMs intact   E/N/M/T: Nares normal. Septum midline. No nasal drainage or sinus tenderness  Tongue midline/ non-edematous  Clear oropharynx   Neck: Normal appearance and movements, symmetrical, trachea midline  No palpable adenopathy  No thyroid enlargement, tenderness or nodules  No carotid bruit   No JVD   Lungs:   Symmetrical chest expansion and respiratory effort  Clear to auscultation bilaterally   Chest wall:  No tenderness or deformity   Heart:  Regular rate and rhythm   Sounds normal; no murmur, click, rub or gallop   Abdomen:   Soft, no tenderness  No rebound, guarding, or rigidity  Non-distended  Bowel sounds normal  No masses or hepatosplenomegaly  No hernias present   Back: No CVA tenderness   Extremities: Extremities normal, atraumatic  No cyanosis or edema  No DVT signs   Pulses 2+ radial/ DP bilateral pulses   Skin: No rashes or ulcers  Warm/ dry   Musculo-      skeletal: Gait not tested  Normal symmetry, ROM, strength and tone  No calf tenderness   Neuro: GCS 15. Moves all extremities x 4. No slurred speech. No facial droop. Sensation grossly intact. Psych: Alert, oriented x 3  Flat affect.   Slow to answer questions         Data Review      24 Hour Results:  Recent Results (from the past 24 hour(s))   ETHYL ALCOHOL    Collection Time: 02/27/18  3:26 PM   Result Value Ref Range    ALCOHOL(ETHYL),SERUM <10 <10 MG/DL   CBC WITH AUTOMATED DIFF    Collection Time: 02/27/18  3:26 PM   Result Value Ref Range    WBC 10.3 3.6 - 11.0 K/uL    RBC 3.71 (L) 3.80 - 5.20 M/uL    HGB 11.6 11.5 - 16.0 g/dL    HCT 33.3 (L) 35.0 - 47.0 %    MCV 89.8 80.0 - 99.0 FL    MCH 31.3 26.0 - 34.0 PG    MCHC 34.8 30.0 - 36.5 g/dL    RDW 13.6 11.5 - 14.5 %    PLATELET 622 967 - 988 K/uL    MPV 9.7 8.9 - 12.9 FL NRBC 0.0 0  WBC    ABSOLUTE NRBC 0.00 0.00 - 0.01 K/uL    NEUTROPHILS 55 32 - 75 %    LYMPHOCYTES 39 12 - 49 %    MONOCYTES 4 (L) 5 - 13 %    EOSINOPHILS 1 0 - 7 %    BASOPHILS 0 0 - 1 %    IMMATURE GRANULOCYTES 0 0.0 - 0.5 %    ABS. NEUTROPHILS 5.7 1.8 - 8.0 K/UL    ABS. LYMPHOCYTES 4.0 (H) 0.8 - 3.5 K/UL    ABS. MONOCYTES 0.4 0.0 - 1.0 K/UL    ABS. EOSINOPHILS 0.1 0.0 - 0.4 K/UL    ABS. BASOPHILS 0.0 0.0 - 0.1 K/UL    ABS. IMM.  GRANS. 0.0 0.00 - 0.04 K/UL    DF AUTOMATED     DRUG SCREEN, URINE    Collection Time: 02/27/18  3:26 PM   Result Value Ref Range    AMPHETAMINES NEGATIVE  NEG      BARBITURATES NEGATIVE  NEG      BENZODIAZEPINES NEGATIVE  NEG      COCAINE NEGATIVE  NEG      METHADONE NEGATIVE  NEG      OPIATES NEGATIVE  NEG      PCP(PHENCYCLIDINE) NEGATIVE  NEG      THC (TH-CANNABINOL) POSITIVE (A) NEG      Drug screen comment (NOTE)    URINALYSIS W/ REFLEX CULTURE    Collection Time: 02/27/18  3:26 PM   Result Value Ref Range    Color YELLOW/STRAW      Appearance CLEAR CLEAR      Specific gravity 1.025 1.003 - 1.030      pH (UA) 6.0 5.0 - 8.0      Protein NEGATIVE  NEG mg/dL    Glucose NEGATIVE  NEG mg/dL    Ketone NEGATIVE  NEG mg/dL    Bilirubin NEGATIVE  NEG      Blood NEGATIVE  NEG      Urobilinogen 1.0 0.2 - 1.0 EU/dL    Nitrites NEGATIVE  NEG      Leukocyte Esterase NEGATIVE  NEG      WBC 0-4 0 - 4 /hpf    RBC 0-5 0 - 5 /hpf    Epithelial cells FEW FEW /lpf    Bacteria NEGATIVE  NEG /hpf    UA:UC IF INDICATED CULTURE NOT INDICATED BY UA RESULT CNI      Mucus 2+ (A) NEG /lpf   METABOLIC PANEL, COMPREHENSIVE    Collection Time: 02/27/18  3:26 PM   Result Value Ref Range    Sodium 137 136 - 145 mmol/L    Potassium 4.0 3.5 - 5.1 mmol/L    Chloride 103 97 - 108 mmol/L    CO2 26 21 - 32 mmol/L    Anion gap 8 5 - 15 mmol/L    Glucose 68 65 - 100 mg/dL    BUN 5 (L) 6 - 20 MG/DL    Creatinine 0.61 0.55 - 1.02 MG/DL    BUN/Creatinine ratio 8 (L) 12 - 20      GFR est AA >60 >60 ml/min/1.73m2    GFR est non-AA >60 >60 ml/min/1.73m2    Calcium 9.3 8.5 - 10.1 MG/DL    Bilirubin, total 0.2 0.2 - 1.0 MG/DL    ALT (SGPT) 7 (L) 12 - 78 U/L    AST (SGOT) 10 (L) 15 - 37 U/L    Alk. phosphatase 106 45 - 117 U/L    Protein, total 7.6 6.4 - 8.2 g/dL    Albumin 3.2 (L) 3.5 - 5.0 g/dL    Globulin 4.4 (H) 2.0 - 4.0 g/dL    A-G Ratio 0.7 (L) 1.1 - 2.2     ACETAMINOPHEN    Collection Time: 02/27/18  3:26 PM   Result Value Ref Range    Acetaminophen level <2 (L) 10 - 30 ug/mL   SALICYLATE    Collection Time: 02/27/18  3:26 PM   Result Value Ref Range    Salicylate level 2.7 (L) 2.8 - 20.0 MG/DL   HCG URINE, QL. - POC    Collection Time: 02/27/18  3:40 PM   Result Value Ref Range    Pregnancy test,urine (POC) POSITIVE (A) NEG     EKG, 12 LEAD, INITIAL    Collection Time: 02/27/18  4:22 PM   Result Value Ref Range    Ventricular Rate 66 BPM    Atrial Rate 66 BPM    P-R Interval 156 ms    QRS Duration 78 ms    Q-T Interval 402 ms    QTC Calculation (Bezet) 421 ms    Calculated P Axis 31 degrees    Calculated R Axis 16 degrees    Calculated T Axis 39 degrees    Diagnosis       Normal sinus rhythm with sinus arrhythmia  Normal ECG  No previous ECGs available       Recent Labs      02/27/18   1526   WBC  10.3   HGB  11.6   HCT  33.3*   PLT  362     Recent Labs      02/27/18   1526   NA  137   K  4.0   CL  103   CO2  26   GLU  68   BUN  5*   CREA  0.61   CA  9.3   ALB  3.2*   TBILI  0.2   SGOT  10*   ALT  7*         Assessment and Plan   1. Depression. Admitted to psychiatry service for continued evaluation and treatments. 2.  Marijuana abuse. Encourage drug cessation. 3.  Hypertension. Not currently on any medications for the same. Monitor blood pressure and consider for starting HCTZ if BP remains elevated. 4.  Tobacco abuse. Encourage smoking cessation. Nicotine patch.                Signed by: Danielle Alarcon MD    February 28, 2018 at 6:32 AM

## 2018-02-28 NOTE — BH NOTES
Primary Nurse Primo Fuentes RN and Carlito Richards RN performed a dual skin assessment on this patient No impairment noted  Brady score is 23    Pt has multiple tattoos all over body  Pt has bilateral nipple piercings  1 belly earring piercings    Pt is increasingly angry, irritable and upset about being in the hospital. Pt is worried and concerned about her kids. Pt refusing select vital signs. Pt initially refuses skin assessment as she begins to yell and cry out loudly towards staff. Security is called for support. Pt slowly begins to cooperate with staff during skin assessment. Pt has pending court dates on 3/1 with [facility] for prescription fraud and [facility]. Pt currently has 3 kids ages 3, 2 , and 1. Currently 3 months pregnancy  Pressure Injury Documentation  (COMPLETE ONE LABEL PER PRESSURE INJURY)  For further information, please review corresponding Wound Care flowsheet. Bart Franco has:    No pressure injury noted and pressure ulcer prevention initiated.         Primo Fuentes RN

## 2018-02-28 NOTE — PROGRESS NOTES
Hospitalist Progress Note  Gerard Farah MD  Answering service: [phone] OR [phone] from in house phone      Date of Service:  2018  NAME:  Mariana Mac  :  1990  MRN:  [id number]      Admission Summary:   Pt admitted to psych unit for depression    Interval history / Subjective:     Pt seen in room, she denies chest pains or shortness of breath, she knows she is pregnant. Counseled about smoking cessation     Assessment & Plan:     Depression  Treatment per Psychiatry    Positive pregnancy test  Check Qt hcg   Consider consult OB to recommend safe medications based on pts  First trimester  Zofran is Class B in pregnancy -could use this for severe nausea    Marijuana abuse  Counseling given     HTN  Added Clonidine for any spikes in fevers      Code status: Full    Care Plan discussed with: Patient/Family and Nurse  Patient has given Verbal permission to discuss medical care with   persons present in the room and and also with contact as listed on face sheet. Disposition: Per primary   We will sign offf please call with any questions     Hospital Problems  Date Reviewed: 2018          Codes Class Noted POA    * (Principal)Hypertension ICD-10-CM: I10  ICD-9-CM: 401.9  2018 Unknown        Depression ICD-10-CM: F32.9  ICD-9-CM: 554  2018 Unknown                Review of Systems:   A comprehensive review of systems was negative except for that written in the HPI. Vital Signs:    Last 24hrs VS reviewed since prior progress note.  Most recent are:  Visit Vitals    /77 (BP Patient Position: Sitting)    Pulse 64    Temp 97.7 °F (36.5 °C)    Resp 16    Ht 6' 1\" (1.854 m)    Wt 97.1 kg (214 lb)    SpO2 100%    BMI 28.23 kg/m2       No intake or output data in the 24 hours ending 18 0932     Physical Examination:             Constitutional:  No acute distress, cooperative, pleasant    ENT:  Oral mucous moist, oropharynx benign. Neck supple,    Resp:  CTA bilaterally. No wheezing/rhonchi/rales. No accessory muscle use   CV:  Regular rhythm, normal rate, no murmurs, gallops, rubs    GI:  Soft, non distended, non tender. normoactive bowel sounds, no hepatosplenomegaly     Musculoskeletal:  No edema, warm, 2+ pulses throughout    Neurologic:  Moves all extremities. AAOx3, CN II-XII reviewed     Psych:  Good insight, Not anxious nor agitated. Data Review:    Review and/or order of clinical lab test  Review and/or order of tests in the radiology section of CPT  Review and/or order of tests in the medicine section of CPT      Labs:     Recent Labs      02/27/18   1526   WBC  10.3   HGB  11.6   HCT  33.3*   PLT  362     Recent Labs      02/27/18   1526   NA  137   K  4.0   CL  103   CO2  26   BUN  5*   CREA  0.61   GLU  68   CA  9.3     Recent Labs      02/27/18   1526   SGOT  10*   ALT  7*   AP  106   TBILI  0.2   TP  7.6   ALB  3.2*   GLOB  4.4*     No results for input(s): INR, PTP, APTT in the last 72 hours. No lab exists for component: INREXT   No results for input(s): FE, TIBC, PSAT, FERR in the last 72 hours. No results found for: FOL, RBCF   No results for input(s): PH, PCO2, PO2 in the last 72 hours. No results for input(s): CPK, CKNDX, TROIQ in the last 72 hours.     No lab exists for component: CPKMB  No results found for: CHOL, CHOLX, CHLST, CHOLV, HDL, LDL, LDLC, DLDLP, TGLX, TRIGL, TRIGP, CHHD, CHHDX  No results found for: [facility]  Lab Results   Component Value Date/Time    Color YELLOW/STRAW 02/27/2018 03:26 PM    Appearance CLEAR 02/27/2018 03:26 PM    Specific gravity 1.025 02/27/2018 03:26 PM    Specific gravity >1.030 (H) 11/16/2014 01:04 PM    pH (UA) 6.0 02/27/2018 03:26 PM    Protein NEGATIVE  02/27/2018 03:26 PM    Glucose NEGATIVE  02/27/2018 03:26 PM    Ketone NEGATIVE  02/27/2018 03:26 PM    Bilirubin NEGATIVE  02/27/2018 03:26 PM    Urobilinogen 1.0 02/27/2018 03:26 PM Nitrites NEGATIVE  02/27/2018 03:26 PM    Leukocyte Esterase NEGATIVE  02/27/2018 03:26 PM    Epithelial cells FEW 02/27/2018 03:26 PM    Bacteria NEGATIVE  02/27/2018 03:26 PM    WBC 0-4 02/27/2018 03:26 PM    RBC 0-5 02/27/2018 03:26 PM         Medications Reviewed:     Current Facility-Administered Medications   Medication Dose Route Frequency    ziprasidone (GEODON) 20 mg in sterile water (preservative free) 1 mL injection  20 mg IntraMUSCular BID PRN    OLANZapine (ZyPREXA) tablet 5 mg  5 mg Oral Q6H PRN    benztropine (COGENTIN) tablet 2 mg  2 mg Oral BID PRN    benztropine (COGENTIN) injection 2 mg  2 mg IntraMUSCular BID PRN    LORazepam (ATIVAN) injection 2 mg  2 mg IntraMUSCular Q4H PRN    LORazepam (ATIVAN) tablet 1 mg  1 mg Oral Q4H PRN    acetaminophen (TYLENOL) tablet 650 mg  650 mg Oral Q4H PRN    magnesium hydroxide (MILK OF MAGNESIA) 400 mg/5 mL oral suspension 30 mL  30 mL Oral DAILY PRN    nicotine (NICODERM CQ) 21 mg/24 hr patch 1 Patch  1 Patch TransDERmal DAILY PRN    zolpidem (AMBIEN) tablet 5 mg  5 mg Oral QHS PRN     ______________________________________________________________________  EXPECTED LENGTH OF STAY: - - -  ACTUAL LENGTH OF STAY:          1                 Sheryl Reich MD

## 2018-02-28 NOTE — BH NOTES
PSYCHOSOCIAL ASSESSMENT    Patient identifying info:  Luciano Justin is a 33 y.o., female admitted 2/27/2018  8:57 PM     Presenting problem and precipitating factors:Patient was admitted on a [facility] TDO due to suicidal ideations. She had a plan to overdose on medications or cut her wrist.  She has a history of suicidal ideations and attempted to cut her wrist when she was 14years old. She is positive for [facility] and smokes daily . She was voluntarily committed at her hearing. She is now willing to step down to CSU     Mental status assessment:She was alert oriented during treatment team.  Mood depressed . Insight and judgement impaired. Current psychiatric/substance abuse providers and contact info:- no current provider    Previous psychiatric or substance abuse services/providers and response to treatment:she was court order  for SA counseling      Family history of substance abuse or mental illness:unknown    Substance abuse history:   Social History   Substance Use Topics    Smoking status: Current Every Day Smoker     Packs/day: 1.50     Years: 0.50    Smokeless tobacco: Never Used    Alcohol use No      Comment: occassionally       History of biomedical complications associated with substance abuse:  None noted    Patient's current acceptance of treatment or motivation for change:poor    Family constellation: , 3 children 3 3and 1years old     Is significant other involved?      Describe support system:     Describe living arrangements and home environment: lives at home with her children     Health issues:   Hospital Problems  Date Reviewed: 2/28/2018          Codes Class Noted POA    * (Principal)Hypertension ICD-10-CM: I10  ICD-9-CM: 401.9  2/28/2018 Unknown        Depression ICD-10-CM: F32.9  ICD-9-CM: 666  2/27/2018 Unknown              Trauma history: none noted     Legal issues: probation related to drug issues     History of  service: no  Financial status: unemployed Worship/cultural factors: none noted     Education/work history:  9 th grade education     Have you been licensed as a health care professional ( current or  ) : no    Leisure and recreation preferences:unknown     Describe coping skills:zane Kraus  2018

## 2018-03-01 PROCEDURE — 74011250637 HC RX REV CODE- 250/637: Performed by: HOSPITALIST

## 2018-03-01 PROCEDURE — 65220000003 HC RM SEMIPRIVATE PSYCH

## 2018-03-01 PROCEDURE — 74011250637 HC RX REV CODE- 250/637: Performed by: PSYCHIATRY & NEUROLOGY

## 2018-03-01 RX ORDER — SERTRALINE HYDROCHLORIDE 50 MG/1
25 TABLET, FILM COATED ORAL DAILY
Status: DISCONTINUED | OUTPATIENT
Start: 2018-03-02 | End: 2018-03-05 | Stop reason: HOSPADM

## 2018-03-01 RX ADMIN — CALCIUM CARBONATE (ANTACID) CHEW TAB 500 MG 200 MG: 500 CHEW TAB at 17:37

## 2018-03-01 RX ADMIN — CALCIUM CARBONATE (ANTACID) CHEW TAB 500 MG 200 MG: 500 CHEW TAB at 11:13

## 2018-03-01 RX ADMIN — ONDANSETRON 4 MG: 4 TABLET, ORALLY DISINTEGRATING ORAL at 08:06

## 2018-03-01 RX ADMIN — CALCIUM CARBONATE (ANTACID) CHEW TAB 500 MG 200 MG: 500 CHEW TAB at 08:03

## 2018-03-01 RX ADMIN — Medication 1 TABLET: at 08:04

## 2018-03-01 NOTE — PROGRESS NOTES
Problem: Falls - Risk of  Goal: *Absence of Falls  Document Jaycee Fall Risk and appropriate interventions in the flowsheet. Outcome: Progressing Towards Goal  Fall Risk Interventions:          Resting in bed with eyes closed, no complaints, no distress noted. Safety measures in place, will continue to monitor.

## 2018-03-01 NOTE — BH NOTES
GROUP THERAPY PROGRESS NOTE    Emma Oquendo did not participate in a 60 minute Acute Unit's Process Group, with a focus identifying feelings, planning for the rest of the day, and discussing discharge.

## 2018-03-01 NOTE — PROGRESS NOTES
Problem: Depressed Mood (Adult/Pediatric)  Goal: *STG: Demonstrates reduction in symptoms and increase in insight into coping skills/future focused  Outcome: Not Progressing Towards Goal  Affect appears depressed. Have encouraged to attend groups,journaling and additional alternative coping skills utilization. Goal: *STG: Remains safe in hospital  Outcome: Progressing Towards Goal  Pt denies any suicidal or homicidal thoughts. Contracts for safety. Remains on q 15 min safety checks. Problem: Falls - Risk of  Goal: *Absence of Falls  Document Jaycee Fall Risk and appropriate interventions in the flowsheet. Fall Risk Interventions:Gait observed as steady. Patinent wearing slip resistant footwear. No falls reported.             Medication Interventions: Assess postural VS orthostatic hypotension

## 2018-03-01 NOTE — BH NOTES
PRN Medication Documentation    Specific patient behavior that led to need for PRN medication: Nausea and vomiting   Staff interventions attempted prior to PRN being given: Relaxation exercises   PRN medication given: Zofran given at 0806  Patient response/effectiveness of PRN medication: Will reassess pt.       Pt. Verbalized decreased sensation of nausea @ 0846

## 2018-03-01 NOTE — INTERDISCIPLINARY ROUNDS
Behavioral Health Interdisciplinary Rounds     Patient Name: Gualberto Ruiz  Age: 33 y.o. Room/Bed:  726/02  Primary Diagnosis: Depression   Admission Status: Voluntary Commitment     Readmission within 30 days: no  Power of  in place: no  Patient requires a blocked bed: no          Reason for blocked bed:     VTE Prophylaxis: No  Flu vaccine given : no   Mobility needs/Fall risk: no    Nutritional Plan: no  Consults:          Labs/Testing due today?: no    Sleep hours: 8.45       Participation in Care/Groups:  no  Medication Compliant?:refused TUMS  PRNS (last 24 hours): Phenergan    Restraints (last 24 hours):  no  Substance Abuse:  yes  CIWA (range last 24 hours):  COWS (range last 24 hours):   Alcohol screening (AUDIT) completed -  AUDIT Score: 0  If applicable, date SBIRT discussed in treatment team AND documented:   Tobacco - patient is a smoker: yes   Date tobacco education completed by RN: 2/28/18  24 hour chart check complete: yes     Patient goal(s) for today:  Treatment team focus/goals: Plan to start Zoloft and consider CSU. Progress note She has been doing well on the unit. LOS:  2  Expected LOS: TBD     Financial concerns/prescription coverage:  No benefits   Date of last family contact:     Family requesting physician contact today:    Discharge plan: She will return home when ready for discharge. Guns in the home:  no      Outpatient provider(s): REYNA     Participating treatment team members: Pritesh Ortiz, PharmD.

## 2018-03-01 NOTE — BH NOTES
GROUP THERAPY PROGRESS NOTE    Emma Oquendo did not participate in yesterday's Acute Unit's Process Group, with a focus identifying feelings, planning for the rest of the day, and discussing discharge.

## 2018-03-01 NOTE — BH NOTES
PSYCHIATRIC PROGRESS NOTE         Patient Name  Gabriella Avila   Date of Birth 1990   [facility] [id number]   Medical Record Number  [id number]      Age  33 y.o. PCP Meera Monroe MD   Admit date:  2/27/2018    Room Number  726/02  @ [facility]   Date of Service  3/1/2018          PSYCHOTHERAPY SESSION NOTE:  Length of psychotherapy session: 45 minutes    Main condition/diagnosis/issues treated during session today, 3/1/2018 : depression, medications, anxiety management    I employed Cognitive Behavioral therapy techniques, Reality-Oriented psychotherapy, as well as supportive psychotherapy in regards to various ongoing psychosocial stressors, including the following: pre-admission and current problems; housing issues; occupational issues; legal issues; medical issues; and stress of hospitalization. Interpersonal relationship issues and psychodynamic conflicts explored. Attempts made to alleviate maladaptive patterns. We, also, worked on issues of denial & effects of substance dependency/use     Overall, patient is not progressing    Treatment Plan Update (reviewed an updated 3/1/2018) : I will modify psychotherapy tx plan by implementing more stress management strategies, building upon cognitive behavioral techniques, increasing coping skills, as well as shoring up psychological defenses). An extended energy and skill set was needed to engage pt in psychotherapy due to some of the following: resistiveness, complexity, negativity, confrontational nature, hostile behaviors, and/or severe abnormalities in thought processes/psychosis resulting in the loss of expressive/receptive language communication skills. E & M PROGRESS NOTE:         HISTORY       CC:  \"SI depression \"  HISTORY OF PRESENT ILLNESS/INTERVAL HISTORY:  (reviewed/updated 3/1/2018).   per initial evaluation:     Emma Oquendo presents/reports/evidences the following emotional symptoms today, 3/1/2018:depression. The above symptoms have been present for years. These symptoms are of severe severity. The symptoms are constant  in nature. Additional symptomatology and features include anxiety. SIDE EFFECTS: (reviewed/updated 3/1/2018)  None reported or admitted to. No noted toxicity with use of Depakote/Tegretol/lithium/Clozaril/TCAs   ALLERGIES:(reviewed/updated 3/1/2018)  Allergies   Allergen Reactions    Other Food Anaphylaxis     ONION    Onion Anaphylaxis      MEDICATIONS PRIOR TO ADMISSION:(reviewed/updated 3/1/2018)  No prescriptions prior to admission. PAST MEDICAL HISTORY: Past medical history from the initial psychiatric evaluation has been reviewed (reviewed/updated 3/1/2018) with no additional updates (I asked patient and no additional past medical history provided). Past Medical History:   Diagnosis Date    Hypertension     Other ill-defined conditions(799.89)     muscle spasms    Other ill-defined conditions(799.89)     a fib    Other ill-defined conditions(799.89)     angina     Past Surgical History:   Procedure Laterality Date    HX ORTHOPAEDIC      left knee      SOCIAL HISTORY: Social history from the initial psychiatric evaluation has been reviewed (reviewed/updated 3/1/2018) with no additional updates (I asked patient and no additional social history provided). Social History     Social History    Marital status: SINGLE     Spouse name: N/A    Number of children: N/A    Years of education: N/A     Occupational History    Not on file.      Social History Main Topics    Smoking status: Current Every Day Smoker     Packs/day: 1.50     Years: 0.50    Smokeless tobacco: Never Used    Alcohol use No      Comment: occassionally    Drug use: Yes     Special: Marijuana    Sexual activity: Yes     Partners: Male     Birth control/ protection: None     Other Topics Concern    Not on file     Social History Narrative    33year old single AA female  (currently 3.5 months preganant). Pt reported SI- dueing her court mandated substance abuse treatment session. She was TDO'd. Pt has a hx of THC use DAILY since age 6. She has 3 children- ages 1,2, and 3. Pt completed the 9th grade. She is on probations (with a future court date for another offense) for possession of controlled substances. FAMILY HISTORY: Family history from the initial psychiatric evaluation has been reviewed (reviewed/updated 3/1/2018) with no additional updates (I asked patient and no additional family history provided). Family History   Problem Relation Age of Onset    Diabetes Mother     Hypertension Mother     Diabetes Maternal Grandfather     Hypertension Maternal Grandfather        REVIEW OF SYSTEMS: (reviewed/updated 3/1/2018)  Appetite:good   Sleep: fitful   All other Review of Systems: Psychological ROS: positive for - behavioral disorder  Cardiovascular ROS: no chest pain or dyspnea on exertion  Gastrointestinal ROS: no abdominal pain, change in bowel habits, or black or bloody stools         [facility] ([facility]):    [facility] FINDINGS ARE WITHIN NORMAL LIMITS (WNL) UNLESS OTHERWISE STATED BELOW. ( ALL OF THE BELOW CATEGORIES OF THE [facility] HAVE BEEN REVIEWED (reviewed 3/1/2018) AND UPDATED AS DEEMED APPROPRIATE )  General Presentation age appropriate, cooperative   Orientation oriented to time, place and person   Vital Signs  See below (reviewed 3/1/2018); Vital Signs (BP, Pulse, & Temp) are within normal limits if not listed below.    Gait and Station Stable/steady, no ataxia   Musculoskeletal System No extrapyramidal symptoms (EPS); no abnormal muscular movements or Tardive Dyskinesia (TD); muscle strength and tone are within normal limits   Language No aphasia or dysarthria   Speech:  hypoverbal   Thought Processes logical; normal rate of thoughts; fair abstract reasoning/computation   Thought Associations goal directed   Thought Content free of delusions Suicidal Ideations contracts for safety   Homicidal Ideations none   Mood:  anxious    Affect:  mood-congruent   Memory recent  fair   Memory remote:  fair   Concentration/Attention:  distractable   Fund of Knowledge average   Insight:  fair   Reliability fair   Judgment:  fair          VITALS:     Patient Vitals for the past 24 hrs:   Temp Pulse Resp BP SpO2   03/01/18 1121 98.7 °F (37.1 °C) 67 16 114/74 -   03/01/18 0804 98.2 °F (36.8 °C) 74 16 128/82 100 %   02/28/18 2025 98.5 °F (36.9 °C) 72 12 103/67 -   02/28/18 1611 98 °F (36.7 °C) 66 16 103/66 -     Wt Readings from Last 3 Encounters:   02/27/18 97.1 kg (214 lb)   02/27/18 96.2 kg (212 lb)   12/05/17 95.3 kg (210 lb)     Temp Readings from Last 3 Encounters:   03/01/18 98.7 °F (37.1 °C)   02/27/18 98.2 °F (36.8 °C)   12/05/17 98.8 °F (37.1 °C)     BP Readings from Last 3 Encounters:   03/01/18 114/74   02/27/18 113/53   12/05/17 (!) 170/131     Pulse Readings from Last 3 Encounters:   03/01/18 67   02/27/18 63   12/05/17 84            DATA     LABORATORY DATA:(reviewed/updated 3/1/2018)  No results found for this or any previous visit (from the past 24 hour(s)). No results found for: VALF2, VALAC, VALP, VALPR, DS6, CRBAM, CRBAMP, CARB2, XCRBAM  No results found for: LITHM   RADIOLOGY REPORTS:(reviewed/updated 3/1/2018)  No results found.        MEDICATIONS     ALL MEDICATIONS:   Current Facility-Administered Medications   Medication Dose Route Frequency    cloNIDine HCl (CATAPRES) tablet 0.1 mg  0.1 mg Oral Q8H PRN    calcium carbonate (TUMS) chewable tablet 200 mg [elemental]  200 mg Oral TID WITH MEALS    prenatal vit-iron fumarate-fa (PRENATAL PLUS with IRON) tablet 1 Tab  1 Tab Oral DAILY    ondansetron (ZOFRAN ODT) tablet 4 mg  4 mg Oral Q8H PRN    ziprasidone (GEODON) 20 mg in sterile water (preservative free) 1 mL injection  20 mg IntraMUSCular BID PRN    OLANZapine (ZyPREXA) tablet 5 mg  5 mg Oral Q6H PRN    benztropine (COGENTIN) tablet 2 mg  2 mg Oral BID PRN    benztropine (COGENTIN) injection 2 mg  2 mg IntraMUSCular BID PRN    acetaminophen (TYLENOL) tablet 650 mg  650 mg Oral Q4H PRN    magnesium hydroxide (MILK OF MAGNESIA) 400 mg/5 mL oral suspension 30 mL  30 mL Oral DAILY PRN    nicotine (NICODERM CQ) 21 mg/24 hr patch 1 Patch  1 Patch TransDERmal DAILY PRN      SCHEDULED MEDICATIONS:   Current Facility-Administered Medications   Medication Dose Route Frequency    calcium carbonate (TUMS) chewable tablet 200 mg [elemental]  200 mg Oral TID WITH MEALS    prenatal vit-iron fumarate-fa (PRENATAL PLUS with IRON) tablet 1 Tab  1 Tab Oral DAILY          ASSESSMENT & PLAN     DIAGNOSES REQUIRING ACTIVE TREATMENT AND MONITORING: (reviewed/updated 3/1/2018)  Patient Active Hospital Problem List:   Depression (2/27/2018)    Assessment: sadness, hopelessness, helplessness, poor energy poor sleep    Plan: antidepressant          In summary, Avel Syed, is a 33 y.o.  female who presents with a severe exacerbation of the principal diagnosis of Depression  Patient's condition is worsening/not improving/not stable . Patient requires continued inpatient hospitalization for further stabilization, safety monitoring and medication management. I will continue to coordinate the provision of individual, milieu, occupational, group, and substance abuse therapies to address target symptoms/diagnoses as deemed appropriate for the individual patient. A coordinated, multidisplinary treatment team round was conducted with the patient (this team consists of the nurse, psychiatric unit pharmcist,  and writer). Complete current electronic health record for patient has been reviewed today including consultant notes, ancillary staff notes, nurses and psychiatric tech notes. Suicide risk assessment completed and patient deemed to be of low risk for suicide at this time.      The following regarding medications was addressed during rounds with patient:   the risks and benefits of the proposed medication. The patient was given the opportunity to ask questions. Informed consent given to the use of the above medications. Will continue to adjust psychiatric and non-psychiatric medications (see above \"medication\" section and orders section for details) as deemed appropriate & based upon diagnoses and response to treatment. I will continue to order blood tests/labs and diagnostic tests as deemed appropriate and review results as they become available (see orders for details and above listed lab/test results). I will order psychiatric records from previous [facility] hospitals to further elucidate the nature of patient's psychopathology and review once available. I will gather additional collateral information from friends, family and o/p treatment team to further elucidate the nature of patient's psychopathology and baselline level of psychiatric functioning. I certify that this patient's inpatient psychiatric hospital services furnished since the previous certification were, and continue to be, required for treatment that could reasonably be expected to improve the patient's condition, or for diagnostic study, and that the patient continues to need, on a daily basis, active treatment furnished directly by or requiring the supervision of inpatient psychiatric facility personnel. In addition, the hospital records show that services furnished were intensive treatment services, admission or related services, or equivalent services.     EXPECTED DISCHARGE DATE/DAY: TBD     DISPOSITION: Home       Signed By:   Tobias Durbin MD  3/1/2018

## 2018-03-02 PROCEDURE — 65220000003 HC RM SEMIPRIVATE PSYCH

## 2018-03-02 PROCEDURE — 74011250637 HC RX REV CODE- 250/637: Performed by: HOSPITALIST

## 2018-03-02 PROCEDURE — 74011250637 HC RX REV CODE- 250/637: Performed by: PSYCHIATRY & NEUROLOGY

## 2018-03-02 RX ADMIN — SERTRALINE HYDROCHLORIDE 25 MG: 50 TABLET ORAL at 08:17

## 2018-03-02 RX ADMIN — CALCIUM CARBONATE (ANTACID) CHEW TAB 500 MG 200 MG: 500 CHEW TAB at 16:27

## 2018-03-02 RX ADMIN — Medication 1 TABLET: at 08:17

## 2018-03-02 RX ADMIN — ONDANSETRON 4 MG: 4 TABLET, ORALLY DISINTEGRATING ORAL at 07:33

## 2018-03-02 RX ADMIN — CALCIUM CARBONATE (ANTACID) CHEW TAB 500 MG 200 MG: 500 CHEW TAB at 11:27

## 2018-03-02 RX ADMIN — ONDANSETRON 4 MG: 4 TABLET, ORALLY DISINTEGRATING ORAL at 16:28

## 2018-03-02 NOTE — INTERDISCIPLINARY ROUNDS
Behavioral Health Interdisciplinary Rounds     Patient Name: Trena King  Age: 33 y.o. Room/Bed:  726/02  Primary Diagnosis: Depression   Admission Status: Voluntary Commitment     Readmission within 30 days: no  Power of  in place: no  Patient requires a blocked bed: no          Reason for blocked bed:     VTE Prophylaxis: No  Flu vaccine given : no   Mobility needs/Fall risk: no    Nutritional Plan: no  Consults:          Labs/Testing due today?: no    Sleep hours: 8+       Participation in Care/Groups:  no  Medication Compliant?: Yes  PRNS (last 24 hours):Phenergan   Restraints (last 24 hours):  no  Substance Abuse:  yes  CIWA (range last 24 hours):  COWS (range last 24 hours):   Alcohol screening (AUDIT) completed -  AUDIT Score: 0  If applicable, date SBIRT discussed in treatment team AND documented:   Tobacco - patient is a smoker: yes   Date tobacco education completed by RN: 2/28/18  24 hour chart check complete: yes     Patient goal(s) for today:   Treatment team focus/goals:   Progress note - She has been pleasant compliant and is attending groups on the unit. LOS:  3  Expected LOS: TBD     Financial concerns/prescription coverage: [facility]   Date of last family contact:     Family requesting physician contact today:   Discharge plan: She will return home when ready for discharge. Guns in the home:  no     Outpatient provider(s): [facility]     Participating treatment team members: Bertin Gonzales - Yelitza Piper Rn - Edmar Holland.

## 2018-03-02 NOTE — BH NOTES
GROUP THERAPY PROGRESS NOTE    Jose Elizondo is participating in [place].      Group time: 15 minutes    Personal goal for participation:  To get her meds right, to not sleep too much    Goal orientation: community    Group therapy participation: active    Therapeutic interventions reviewed and discussed: Review of unit guidelines and setting a daily goal.    Impression of participation: active

## 2018-03-02 NOTE — BH NOTES
GROUP THERAPY PROGRESS NOTE    Justino Alejo participated in the Acute Unit's afternoon Process Group for yesterday, with a focus on identifying feelings, planning for the rest of the day, and preparing for discharge. Group time: 60 minutes. Personal goal for participation: To increase the capacity to improve ones mood and structure. Goal orientation: The patient will be able to identify their feelings, develop a plan for structuring their day, and discharge planning. Group therapy participation: With prompting, this patient participated in the group. Therapeutic interventions reviewed and discussed: The group members were asked to introduce themselves to each other and to see if they could identify an emotion they are having and/or let the group know what they want to focus on for the day as they continue to make discharge plans. Impression of participation: The patient said she wanted to be called \"Boy boy. \" She spoke about her struggle with depression and how busy it is taking care of three young children, ages 3, 2, and 3. She also admitted being pregnant at this time. She thought she ought to be going home soon to take care of her children, especially now that she is on an anti-depressant again. She shared no SI/HI and displayed no overt psychotic symptoms in this group. She interacted appropriately with her peers and occasionally offered an observation based on her own experience. Her affect was more anxious than depressed this afternoon. Her mood matched her affect and she was cooperative. She was alert and appeared generally oriented. This was the patient's first process group with the undersigned.

## 2018-03-02 NOTE — PROGRESS NOTES
Problem: Falls - Risk of  Goal: *Absence of Falls  Document Jaycee Fall Risk and appropriate interventions in the flowsheet. Outcome: Progressing Towards Goal  Fall Risk Interventions:            Medication Interventions: Assess postural VS orthostatic hypotension       Resting in bed with eyes closed, no complaints, no distress noted. Safety measures in place, will continue to monitor.

## 2018-03-02 NOTE — PROGRESS NOTES
Problem: Depressed Mood (Adult/Pediatric)  Goal: *STG: Participates in treatment plan  Outcome: Progressing Towards Goal   Pt. Met with Dr. Itz Damico in treatment team     Blunted  Affect    Sad    Discussed her increasing depression  New pregnancy  Goal: *STG: Attends activities and groups  Outcome: Progressing Towards Goal  Attends select groups  Goal: *STG: Complies with medication therapy  Outcome: Progressing Towards Goal  Medication compliant   Reviewed  In treatment team      Started  zoloft today  Goal: Interventions  Outcome: Progressing Towards Goal  Will continue to monitor  On 15 min. Checks for safety  Assess  Depression    Medication  Compliance   Effectiveness    Encourage  groups    Problem: Falls - Risk of  Goal: *Absence of Falls  Document Jaycee Fall Risk and appropriate interventions in the flowsheet.    Outcome: Progressing Towards Goal  Fall Risk Interventions:  Non slip  Socks    Bed in low position              Medication Interventions: Teach patient to arise slowly

## 2018-03-02 NOTE — BH NOTES
PRN Medication Documentation    Specific patient behavior that led to need for PRN medication: pt reporting nausea  Staff interventions attempted prior to PRN being given: listening presence  PRN medication given: zofran po prn at 1628  Patient response/effectiveness of PRN medication: pt requested and received a second dinner tray, reports relief of anxiety, smiling, laughing and interacting with peers

## 2018-03-02 NOTE — PROGRESS NOTES
Problem: Depressed Mood (Adult/Pediatric)  Goal: *STG: Remains safe in hospital  Outcome: Progressing Towards Goal  Pt is out in milieu with peers during meal time. Cooperative some complaints of nausea. Given gingerale and dontrell to aide in nausea. Staff will continue to monitor q 15 min checks.

## 2018-03-02 NOTE — PROGRESS NOTES
Problem: Depressed Mood (Adult/Pediatric)  Goal: *STG: Remains safe in hospital  Outcome: Progressing Towards Goal  Patient is lying quietly in bed. Appears to be asleep. No respiratory distress noted.   Patient remains safe in hospital.

## 2018-03-02 NOTE — BH NOTES
PRN Medication Documentation    Specific patient behavior that led to need for PRN medication: Patient complained of indigestion. Staff interventions attempted prior to PRN being given: Offered PRN meds. PRN medication given: TUMS chewable tablets 200 mg PO  Patient response/effectiveness of PRN medication: Will continue to monitor.

## 2018-03-03 PROCEDURE — 74011250637 HC RX REV CODE- 250/637: Performed by: HOSPITALIST

## 2018-03-03 PROCEDURE — 74011250637 HC RX REV CODE- 250/637: Performed by: PSYCHIATRY & NEUROLOGY

## 2018-03-03 PROCEDURE — 74011250637 HC RX REV CODE- 250/637

## 2018-03-03 PROCEDURE — 65220000003 HC RM SEMIPRIVATE PSYCH

## 2018-03-03 RX ADMIN — CALCIUM CARBONATE (ANTACID) CHEW TAB 500 MG 200 MG: 500 CHEW TAB at 09:08

## 2018-03-03 RX ADMIN — CALCIUM CARBONATE (ANTACID) CHEW TAB 500 MG 200 MG: 500 CHEW TAB at 17:33

## 2018-03-03 RX ADMIN — CALCIUM CARBONATE (ANTACID) CHEW TAB 500 MG 200 MG: 500 CHEW TAB at 12:23

## 2018-03-03 RX ADMIN — SERTRALINE HYDROCHLORIDE 25 MG: 50 TABLET ORAL at 09:08

## 2018-03-03 RX ADMIN — ONDANSETRON 4 MG: 4 TABLET, ORALLY DISINTEGRATING ORAL at 17:34

## 2018-03-03 RX ADMIN — Medication 1 TABLET: at 09:08

## 2018-03-03 RX ADMIN — MAGNESIUM HYDROXIDE 30 ML: 400 SUSPENSION ORAL at 17:35

## 2018-03-03 RX ADMIN — ONDANSETRON 4 MG: 4 TABLET, ORALLY DISINTEGRATING ORAL at 08:05

## 2018-03-03 NOTE — PROGRESS NOTES
Problem: Depressed Mood (Adult/Pediatric)  Goal: *STG: Remains safe in hospital  Outcome: Progressing Towards Goal  Pt out in milieu more today laughing and smiling with peers. No acute distress noted at this time. Some mild complaints of constipation and nausea. Staff will continue to monitor q 15 min checks.

## 2018-03-03 NOTE — BH NOTES
PRN Medication Documentation    Specific patient behavior that led to need for PRN medication: nausea  Staff interventions attempted prior to PRN being given: rest  PRN medication given: Zofran  Patient response/effectiveness of PRN medication: will reassess    0900- nausea relieved.

## 2018-03-03 NOTE — BH NOTES
PSYCHIATRIC PROGRESS NOTE         Patient Name  Sangita Caceres   Date of Birth 1990   [facility] [id number]   Medical Record Number  [id number]      Age  33 y.o. PCP Zachary Engle MD   Admit date:  2/27/2018    Room Number  726/02  @ [facility]   Date of Service  3/3/2018          PSYCHOTHERAPY SESSION NOTE:  Length of psychotherapy session: 15 minutes    Main condition/diagnosis/issues treated during session today, 3/3/2018 : depression    I employed Cognitive Behavioral therapy techniques, Reality-Oriented psychotherapy, as well as supportive psychotherapy in regards to various ongoing psychosocial stressors, including the following: pre-admission and current problems; housing issues; occupational issues; academic issues; egal issues; medical issues; and stress of hospitalization. Interpersonal relationship issues and psychodynamic conflicts explored. Attempts made to alleviate maladaptive patterns. We, also, worked on issues of denial & effects of substance dependency/use     Overall, patient is  progressing    Treatment Plan Update (reviewed an updated 3/3/2018) : I will modify psychotherapy tx plan by implementing more stress management strategies, building upon cognitive behavioral techniques, increasing coping skills, as well as shoring up psychological defenses). An extended energy and skill set was needed to engage pt in psychotherapy due to some of the following: resistiveness, complexity, negativity, confrontational nature, hostile behaviors, and/or severe abnormalities in thought processes/psychosis resulting in the loss of expressive/receptive language communication skills. E & M PROGRESS NOTE:         HISTORY       CC:  \"depression\"  HISTORY OF PRESENT ILLNESS/INTERVAL HISTORY:  (reviewed/updated 3/3/2018). per initial evaluation:   The patient, Sangita Caceres, is a 33 y.o.   BLACK OR  female with a past psychiatric history significant for dubstance induced mood d/o , who presents at this time with complaints of (and/or evidence of) the following emotional symptoms: depression and suicidal thoughts/threats. Additional symptomatology include anxiety. The above symptoms have been present for years. These symptoms are of severe severity. These symptoms are constant  in nature. The patient's condition has been precipitated by ambivalence about current pregnancy and psychosocial stressors (upcoming court date ). Patient's condition made worse by continued illicit drug use as well as treatment noncompliance. UDS: +MJ ; BAL=0. Emma Azra presents/reports/evidences the following emotional symptoms today, 3/3/2018:depression. The above symptoms have been present for several days to a week. These symptoms are of moderate/ improving severity. The symptoms are intermittent/ fleeting in nature. Additional symptomatology and features include much improved depression. Pt presented in elated mood and with bright affect, smiling and stated she felt great. Only on 25 mg of Sertraline. No SI or AVH. SIDE EFFECTS: (reviewed/updated 3/3/2018)  None reported or admitted to. ALLERGIES:(reviewed/updated 3/3/2018)  Allergies   Allergen Reactions    Other Food Anaphylaxis     ONION    Onion Anaphylaxis      MEDICATIONS PRIOR TO ADMISSION:(reviewed/updated 3/3/2018)  No prescriptions prior to admission. PAST MEDICAL HISTORY: Past medical history from the initial psychiatric evaluation has been reviewed (reviewed/updated 3/3/2018) with no additional updates (I asked patient and no additional past medical history provided).    Past Medical History:   Diagnosis Date    Hypertension     Other ill-defined conditions(799.89)     muscle spasms    Other ill-defined conditions(799.89)     a fib    Other ill-defined conditions(799.89)     angina     Past Surgical History:   Procedure Laterality Date    HX ORTHOPAEDIC      left knee SOCIAL HISTORY: Social history from the initial psychiatric evaluation has been reviewed (reviewed/updated 3/3/2018) with no additional updates (I asked patient and no additional social history provided). Social History     Social History    Marital status: SINGLE     Spouse name: N/A    Number of children: N/A    Years of education: N/A     Occupational History    Not on file. Social History Main Topics    Smoking status: Current Every Day Smoker     Packs/day: 1.50     Years: 0.50    Smokeless tobacco: Never Used    Alcohol use No      Comment: occassionally    Drug use: Yes     Special: Marijuana    Sexual activity: Yes     Partners: Male     Birth control/ protection: None     Other Topics Concern    Not on file     Social History Narrative    33year old single AA female  (currently 3.5 months preganant). Pt reported SI- dueing her court mandated substance abuse treatment session. She was TDO'd. Pt has a hx of THC use DAILY since age 6. She has 3 children- ages 1,2, and 3. Pt completed the 9th grade. She is on probations (with a future court date for another offense) for possession of controlled substances. FAMILY HISTORY: Family history from the initial psychiatric evaluation has been reviewed (reviewed/updated 3/3/2018) with no additional updates (I asked patient and no additional family history provided).    Family History   Problem Relation Age of Onset    Diabetes Mother     Hypertension Mother     Diabetes Maternal Grandfather     Hypertension Maternal Grandfather        REVIEW OF SYSTEMS: (reviewed/updated 3/3/2018)  Appetite:good   Sleep: good   All other Review of Systems: Negative          MENTAL Iris [address] (MSE):    MSE FINDINGS ARE WITHIN NORMAL LIMITS (WNL) UNLESS OTHERWISE STATED BELOW. ( ALL OF THE BELOW CATEGORIES OF THE MSE HAVE BEEN REVIEWED (reviewed 3/3/2018) AND UPDATED AS DEEMED APPROPRIATE )  General Presentation age appropriate and casually dressed, cooperative   Orientation oriented to time, place and person   Vital Signs  See below (reviewed 3/3/2018); Vital Signs (BP, Pulse, & Temp) are within normal limits if not listed below. Gait and Station Stable/steady, no ataxia   Musculoskeletal System No extrapyramidal symptoms (EPS); no abnormal muscular movements or Tardive Dyskinesia (TD); muscle strength and tone are within normal limits   Language No aphasia or dysarthria   Speech:  normal pitch and normal volume   Thought Processes logical; normal rate of thoughts; fair abstract reasoning/computation   Thought Associations goal directed   Thought Content not internally preoccupied   Suicidal Ideations none   Homicidal Ideations none   Mood:  elated   Affect:  bright   Memory recent  fair   Memory remote:  fair   Concentration/Attention:  distractable   Fund of Knowledge avg   Insight:  fair   Reliability fair   Judgment:  good          VITALS:     Patient Vitals for the past 24 hrs:   Temp Pulse Resp BP SpO2   03/03/18 1542 98.8 °F (37.1 °C) 65 16 148/88 100 %   03/03/18 0840 98.8 °F (37.1 °C) 70 16 129/84 100 %   03/02/18 1944 98.5 °F (36.9 °C) 63 16 122/80 -     Wt Readings from Last 3 Encounters:   03/03/18 87 kg (191 lb 11.2 oz)   02/27/18 96.2 kg (212 lb)   12/05/17 95.3 kg (210 lb)     Temp Readings from Last 3 Encounters:   03/03/18 98.8 °F (37.1 °C)   02/27/18 98.2 °F (36.8 °C)   12/05/17 98.8 °F (37.1 °C)     BP Readings from Last 3 Encounters:   03/03/18 148/88   02/27/18 113/53   12/05/17 (!) 170/131     Pulse Readings from Last 3 Encounters:   03/03/18 65   02/27/18 63   12/05/17 84            DATA     LABORATORY DATA:(reviewed/updated 3/3/2018)  No results found for this or any previous visit (from the past 24 hour(s)). No results found for: VALF2, VALAC, VALP, VALPR, DS6, CRBAM, CRBAMP, CARB2, XCRBAM  No results found for: LITHM   RADIOLOGY REPORTS:(reviewed/updated 3/3/2018)  No results found. MEDICATIONS     ALL MEDICATIONS:   Current Facility-Administered Medications   Medication Dose Route Frequency    sertraline (ZOLOFT) tablet 25 mg  25 mg Oral DAILY    cloNIDine HCl (CATAPRES) tablet 0.1 mg  0.1 mg Oral Q8H PRN    calcium carbonate (TUMS) chewable tablet 200 mg [elemental]  200 mg Oral TID WITH MEALS    prenatal vit-iron fumarate-fa (PRENATAL PLUS with IRON) tablet 1 Tab  1 Tab Oral DAILY    ondansetron (ZOFRAN ODT) tablet 4 mg  4 mg Oral Q8H PRN    ziprasidone (GEODON) 20 mg in sterile water (preservative free) 1 mL injection  20 mg IntraMUSCular BID PRN    OLANZapine (ZyPREXA) tablet 5 mg  5 mg Oral Q6H PRN    benztropine (COGENTIN) tablet 2 mg  2 mg Oral BID PRN    benztropine (COGENTIN) injection 2 mg  2 mg IntraMUSCular BID PRN    acetaminophen (TYLENOL) tablet 650 mg  650 mg Oral Q4H PRN    magnesium hydroxide (MILK OF MAGNESIA) 400 mg/5 mL oral suspension 30 mL  30 mL Oral DAILY PRN    nicotine (NICODERM CQ) 21 mg/24 hr patch 1 Patch  1 Patch TransDERmal DAILY PRN      SCHEDULED MEDICATIONS:   Current Facility-Administered Medications   Medication Dose Route Frequency    sertraline (ZOLOFT) tablet 25 mg  25 mg Oral DAILY    calcium carbonate (TUMS) chewable tablet 200 mg [elemental]  200 mg Oral TID WITH MEALS    prenatal vit-iron fumarate-fa (PRENATAL PLUS with IRON) tablet 1 Tab  1 Tab Oral DAILY          ASSESSMENT & PLAN     DIAGNOSES REQUIRING ACTIVE TREATMENT AND MONITORING: (reviewed/updated 3/3/2018)  Patient Active Hospital Problem List:   Depression (2/27/2018)    Assessment: improved    Plan: continued inpatient admission for further stabilization  Awaiting csu bed  Continue zoloft 25mg daily    Pregnant  Assessment: stable  Continue pnv            I will continue to monitor blood levels (Depakote, Tegretol, lithium, clozapine---a drug with a narrow therapeutic index= NTI) and associated labs for drug therapy implemented that require intense monitoring for toxicity as deemed appropriate based on current medication side effects and pharmacodynamically determined drug 1/2 lives. In summary, Bakari Mueller, is a 33 y.o.  female who presents with a severe exacerbation of the principal diagnosis of Depression  Patient's condition is improving. Patient requires continued inpatient hospitalization for further stabilization, safety monitoring and medication management. I will continue to coordinate the provision of individual, milieu, occupational, group, and substance abuse therapies to address target symptoms/diagnoses as deemed appropriate for the individual patient. A coordinated, multidisplinary treatment team round was conducted with the patient (this team consists of the nurse, psychiatric unit pharmcist,  and writer). Complete current electronic health record for patient has been reviewed today including consultant notes, ancillary staff notes, nurses and psychiatric tech notes. Suicide risk assessment completed and patient deemed to be of low risk for suicide at this time. The following regarding medications was addressed during rounds with patient:   the risks and benefits of the proposed medication. The patient was given the opportunity to ask questions. Informed consent given to the use of the above medications. Will continue to adjust psychiatric and non-psychiatric medications (see above \"medication\" section and orders section for details) as deemed appropriate & based upon diagnoses and response to treatment. I will continue to order blood tests/labs and diagnostic tests as deemed appropriate and review results as they become available (see orders for details and above listed lab/test results). I will order psychiatric records from previous [facility] hospitals to further elucidate the nature of patient's psychopathology and review once available.     I will gather additional collateral information from friends, family and o/p treatment team to further elucidate the nature of patient's psychopathology and baselline level of psychiatric functioning. I certify that this patient's inpatient psychiatric hospital services furnished since the previous certification were, and continue to be, required for treatment that could reasonably be expected to improve the patient's condition, or for diagnostic study, and that the patient continues to need, on a daily basis, active treatment furnished directly by or requiring the supervision of inpatient psychiatric facility personnel. In addition, the hospital records show that services furnished were intensive treatment services, admission or related services, or equivalent services.     EXPECTED DISCHARGE DATE/DAY: TBD     DISPOSITION: Home       Signed By:   Antione Rocha MD  3/3/2018

## 2018-03-03 NOTE — BH NOTES
GROUP THERAPY PROGRESS NOTE    Gualberto Ruiz is participating in D/C Planning Group    Group time: 45 minutes    Personal goal for participation: Readiness for D/C    Goal orientation: Barriers to Recovery     Group therapy participation:  Engaged    Therapeutic interventions reviewed and discussed:     Impression of participation: Lots of energy, smiling and told peers that she has a good support team consisting of her fiancee, mother , brother and mental health professionals.

## 2018-03-03 NOTE — PROGRESS NOTES
Problem: Depressed Mood (Adult/Pediatric)  Goal: *STG: Participates in treatment plan  Outcome: Progressing Towards Goal  Pt. Met with Evelyne Kendall in treatment team   states less depressed   More social on unit  smiling  Goal: *STG: Attends activities and groups  Outcome: Progressing Towards Goal  Attending groups on unit  Goal: *STG: Complies with medication therapy  Outcome: Progressing Towards Goal  Medication compliant  Started zoloft    Reviewed in treatment team  Goal: Interventions  Outcome: Progressing Towards Goal  Will continue to monitor   On 15 min. Checks  for safety  Assess depression  Hopelessness   Medication compliance   Effectiveness   Encourage groups    Problem: Falls - Risk of  Goal: *Absence of Falls  Document Jaycee Fall Risk and appropriate interventions in the flowsheet.    Outcome: Progressing Towards Goal  Fall Risk Interventions:   non slip socks   Bed in low position         Medication Interventions: Teach patient to arise slowly

## 2018-03-03 NOTE — BH NOTES
PSYCHIATRIC PROGRESS NOTE         Patient Name  Yue Manuel   Date of Birth 1990   [facility] [id number]   Medical Record Number  [id number]      Age  33 y.o. PCP Shira Edmonds MD   Admit date:  2/27/2018    Room Number  726/02  @ [facility]   Date of Service  3/2/2018          PSYCHOTHERAPY SESSION NOTE:  Length of psychotherapy session: 15 minutes    Main condition/diagnosis/issues treated during session today, 3/2/2018 : depression    I employed Cognitive Behavioral therapy techniques, Reality-Oriented psychotherapy, as well as supportive psychotherapy in regards to various ongoing psychosocial stressors, including the following: pre-admission and current problems; housing issues; occupational issues; academic issues; egal issues; medical issues; and stress of hospitalization. Interpersonal relationship issues and psychodynamic conflicts explored. Attempts made to alleviate maladaptive patterns. We, also, worked on issues of denial & effects of substance dependency/use     Overall, patient is  progressing    Treatment Plan Update (reviewed an updated 3/2/2018) : I will modify psychotherapy tx plan by implementing more stress management strategies, building upon cognitive behavioral techniques, increasing coping skills, as well as shoring up psychological defenses). An extended energy and skill set was needed to engage pt in psychotherapy due to some of the following: resistiveness, complexity, negativity, confrontational nature, hostile behaviors, and/or severe abnormalities in thought processes/psychosis resulting in the loss of expressive/receptive language communication skills. E & M PROGRESS NOTE:         HISTORY       CC:  \"depression\"  HISTORY OF PRESENT ILLNESS/INTERVAL HISTORY:  (reviewed/updated 3/2/2018). per initial evaluation:   The patient, Yue Manuel, is a 33 y.o.   BLACK OR  female with a past psychiatric history significant for dubstance induced mood d/o , who presents at this time with complaints of (and/or evidence of) the following emotional symptoms: depression and suicidal thoughts/threats. Additional symptomatology include anxiety. The above symptoms have been present for years. These symptoms are of severe severity. These symptoms are constant  in nature. The patient's condition has been precipitated by ambivalence about current pregnancy and psychosocial stressors (upcoming court date ). Patient's condition made worse by continued illicit drug use as well as treatment noncompliance. UDS: +MJ ; BAL=0. Maribelkayesharee Azra presents/reports/evidences the following emotional symptoms today, 3/2/2018:depression. The above symptoms have been present for several days to a week. These symptoms are of moderate/ improving severity. The symptoms are intermittent/ fleeting in nature. Additional symptomatology and features include less depression. SIDE EFFECTS: (reviewed/updated 3/2/2018)  None reported or admitted to. ALLERGIES:(reviewed/updated 3/2/2018)  Allergies   Allergen Reactions    Other Food Anaphylaxis     ONION    Onion Anaphylaxis      MEDICATIONS PRIOR TO ADMISSION:(reviewed/updated 3/2/2018)  No prescriptions prior to admission. PAST MEDICAL HISTORY: Past medical history from the initial psychiatric evaluation has been reviewed (reviewed/updated 3/2/2018) with no additional updates (I asked patient and no additional past medical history provided).  Past Medical History:   Diagnosis Date    Hypertension     Other ill-defined conditions(799.89)     muscle spasms    Other ill-defined conditions(799.89)     a fib    Other ill-defined conditions(799.89)     angina     Past Surgical History:   Procedure Laterality Date    HX ORTHOPAEDIC      left knee      SOCIAL HISTORY: Social history from the initial psychiatric evaluation has been reviewed (reviewed/updated 3/2/2018) with no additional updates (I asked patient and no additional social history provided). Social History     Social History    Marital status: SINGLE     Spouse name: N/A    Number of children: N/A    Years of education: N/A     Occupational History    Not on file. Social History Main Topics    Smoking status: Current Every Day Smoker     Packs/day: 1.50     Years: 0.50    Smokeless tobacco: Never Used    Alcohol use No      Comment: occassionally    Drug use: Yes     Special: Marijuana    Sexual activity: Yes     Partners: Male     Birth control/ protection: None     Other Topics Concern    Not on file     Social History Narrative    33year old single AA female  (currently 3.5 months preganant). Pt reported SI- dueing her court mandated substance abuse treatment session. She was TDO'd. Pt has a hx of THC use DAILY since age 6. She has 3 children- ages 1,2, and 3. Pt completed the 9th grade. She is on probations (with a future court date for another offense) for possession of controlled substances. FAMILY HISTORY: Family history from the initial psychiatric evaluation has been reviewed (reviewed/updated 3/2/2018) with no additional updates (I asked patient and no additional family history provided). Family History   Problem Relation Age of Onset    Diabetes Mother     Hypertension Mother     Diabetes Maternal Grandfather     Hypertension Maternal Grandfather        REVIEW OF SYSTEMS: (reviewed/updated 3/2/2018)  Appetite:good   Sleep: good   All other Review of Systems: Negative          MENTAL [address] (MSE):    MSE FINDINGS ARE WITHIN NORMAL LIMITS (WNL) UNLESS OTHERWISE STATED BELOW. ( ALL OF THE BELOW CATEGORIES OF THE MSE HAVE BEEN REVIEWED (reviewed 3/2/2018) AND UPDATED AS DEEMED APPROPRIATE )  General Presentation age appropriate and casually dressed, cooperative   Orientation oriented to time, place and person   Vital Signs  See below (reviewed 3/2/2018);  Vital Signs (BP, Pulse, & Temp) are within normal limits if not listed below. Gait and Station Stable/steady, no ataxia   Musculoskeletal System No extrapyramidal symptoms (EPS); no abnormal muscular movements or Tardive Dyskinesia (TD); muscle strength and tone are within normal limits   Language No aphasia or dysarthria   Speech:  normal pitch and normal volume   Thought Processes logical; normal rate of thoughts; fair abstract reasoning/computation   Thought Associations goal directed   Thought Content not internally preoccupied   Suicidal Ideations none   Homicidal Ideations none   Mood:  euthymic   Affect:  euthymic   Memory recent  fair   Memory remote:  fair   Concentration/Attention:  distractable   Fund of Knowledge avg   Insight:  fair   Reliability fair   Judgment:  good          VITALS:     Patient Vitals for the past 24 hrs:   Temp Pulse Resp BP SpO2   03/02/18 1714 98.4 °F (36.9 °C) (!) 56 18 137/80 100 %   03/02/18 1108 99 °F (37.2 °C) 64 16 136/74 -   03/02/18 0803 98.1 °F (36.7 °C) 60 18 121/67 100 %     Wt Readings from Last 3 Encounters:   02/27/18 97.1 kg (214 lb)   02/27/18 96.2 kg (212 lb)   12/05/17 95.3 kg (210 lb)     Temp Readings from Last 3 Encounters:   03/02/18 98.4 °F (36.9 °C)   02/27/18 98.2 °F (36.8 °C)   12/05/17 98.8 °F (37.1 °C)     BP Readings from Last 3 Encounters:   03/02/18 137/80   02/27/18 113/53   12/05/17 (!) 170/131     Pulse Readings from Last 3 Encounters:   03/02/18 (!) 56   02/27/18 63   12/05/17 84            DATA     LABORATORY DATA:(reviewed/updated 3/2/2018)  No results found for this or any previous visit (from the past 24 hour(s)). No results found for: VALF2, VALAC, VALP, VALPR, DS6, CRBAM, CRBAMP, CARB2, XCRBAM  No results found for: LITHM   RADIOLOGY REPORTS:(reviewed/updated 3/2/2018)  No results found.        MEDICATIONS     ALL MEDICATIONS:   Current Facility-Administered Medications   Medication Dose Route Frequency    sertraline (ZOLOFT) tablet 25 mg  25 mg Oral DAILY    cloNIDine HCl (CATAPRES) tablet 0.1 mg  0.1 mg Oral Q8H PRN    calcium carbonate (TUMS) chewable tablet 200 mg [elemental]  200 mg Oral TID WITH MEALS    prenatal vit-iron fumarate-fa (PRENATAL PLUS with IRON) tablet 1 Tab  1 Tab Oral DAILY    ondansetron (ZOFRAN ODT) tablet 4 mg  4 mg Oral Q8H PRN    ziprasidone (GEODON) 20 mg in sterile water (preservative free) 1 mL injection  20 mg IntraMUSCular BID PRN    OLANZapine (ZyPREXA) tablet 5 mg  5 mg Oral Q6H PRN    benztropine (COGENTIN) tablet 2 mg  2 mg Oral BID PRN    benztropine (COGENTIN) injection 2 mg  2 mg IntraMUSCular BID PRN    acetaminophen (TYLENOL) tablet 650 mg  650 mg Oral Q4H PRN    magnesium hydroxide (MILK OF MAGNESIA) 400 mg/5 mL oral suspension 30 mL  30 mL Oral DAILY PRN    nicotine (NICODERM CQ) 21 mg/24 hr patch 1 Patch  1 Patch TransDERmal DAILY PRN      SCHEDULED MEDICATIONS:   Current Facility-Administered Medications   Medication Dose Route Frequency    sertraline (ZOLOFT) tablet 25 mg  25 mg Oral DAILY    calcium carbonate (TUMS) chewable tablet 200 mg [elemental]  200 mg Oral TID WITH MEALS    prenatal vit-iron fumarate-fa (PRENATAL PLUS with IRON) tablet 1 Tab  1 Tab Oral DAILY          ASSESSMENT & PLAN     DIAGNOSES REQUIRING ACTIVE TREATMENT AND MONITORING: (reviewed/updated 3/2/2018)  Patient Active Hospital Problem List:   Depression (2/27/2018)    Assessment: improved    Plan: continued inpatient admission for further stabilization  Awaiting csu bed  Continue zoloft 25mg daily    Pregnant  Assessment: stable  Continue pnv            I will continue to monitor blood levels (Depakote, Tegretol, lithium, clozapine---a drug with a narrow therapeutic index= NTI) and associated labs for drug therapy implemented that require intense monitoring for toxicity as deemed appropriate based on current medication side effects and pharmacodynamically determined drug 1/2 lives.     In summary, [employer], is a 33 y.o.  female who presents with a severe exacerbation of the principal diagnosis of Depression  Patient's condition is improving. Patient requires continued inpatient hospitalization for further stabilization, safety monitoring and medication management. I will continue to coordinate the provision of individual, milieu, occupational, group, and substance abuse therapies to address target symptoms/diagnoses as deemed appropriate for the individual patient. A coordinated, multidisplinary treatment team round was conducted with the patient (this team consists of the nurse, psychiatric unit pharmcist,  and writer). Complete current electronic health record for patient has been reviewed today including consultant notes, ancillary staff notes, nurses and psychiatric tech notes. Suicide risk assessment completed and patient deemed to be of low risk for suicide at this time. The following regarding medications was addressed during rounds with patient:   the risks and benefits of the proposed medication. The patient was given the opportunity to ask questions. Informed consent given to the use of the above medications. Will continue to adjust psychiatric and non-psychiatric medications (see above \"medication\" section and orders section for details) as deemed appropriate & based upon diagnoses and response to treatment. I will continue to order blood tests/labs and diagnostic tests as deemed appropriate and review results as they become available (see orders for details and above listed lab/test results). I will order psychiatric records from previous [facility] hospitals to further elucidate the nature of patient's psychopathology and review once available. I will gather additional collateral information from friends, family and o/p treatment team to further elucidate the nature of patient's psychopathology and baselline level of psychiatric functioning.          I certify that this patient's inpatient psychiatric hospital services furnished since the previous certification were, and continue to be, required for treatment that could reasonably be expected to improve the patient's condition, or for diagnostic study, and that the patient continues to need, on a daily basis, active treatment furnished directly by or requiring the supervision of inpatient psychiatric facility personnel. In addition, the hospital records show that services furnished were intensive treatment services, admission or related services, or equivalent services.     EXPECTED DISCHARGE DATE/DAY: TBD     DISPOSITION: Home       Signed By:   Michael Sherman MD  3/2/2018

## 2018-03-03 NOTE — PROGRESS NOTES
Problem: Falls - Risk of  Goal: *Absence of Falls  Document Jaycee Fall Risk and appropriate interventions in the flowsheet. Outcome: Progressing Towards Goal  Fall Risk Interventions:            Medication Interventions: Teach patient to arise slowly       Resting in bed with eyes closed, no complaints, no distress noted. Safety measures in place, will continue to monitor.

## 2018-03-03 NOTE — BH NOTES
LEISURE GROUP THERAPY PROGRESS NOTE    The patient Jesus tolliver 33 y.o. female is participating in Leisure Group. Group time: 45 minutes    Personal goal for participation: Daily social interactions with other peers & staff.     Goal orientation: Relaxation activities    Group therapy participation: active    Therapeutic interventions reviewed and discussed:  Yes    Impression of participation: Keely Navarrete  3/2/2018  9:23 PM

## 2018-03-04 PROCEDURE — 74011250637 HC RX REV CODE- 250/637: Performed by: HOSPITALIST

## 2018-03-04 PROCEDURE — 65220000003 HC RM SEMIPRIVATE PSYCH

## 2018-03-04 PROCEDURE — 74011250637 HC RX REV CODE- 250/637: Performed by: PSYCHIATRY & NEUROLOGY

## 2018-03-04 RX ORDER — ONDANSETRON 4 MG/1
4 TABLET, ORALLY DISINTEGRATING ORAL ONCE
Status: COMPLETED | OUTPATIENT
Start: 2018-03-04 | End: 2018-03-04

## 2018-03-04 RX ADMIN — CALCIUM CARBONATE (ANTACID) CHEW TAB 500 MG 200 MG: 500 CHEW TAB at 08:29

## 2018-03-04 RX ADMIN — SERTRALINE HYDROCHLORIDE 25 MG: 50 TABLET ORAL at 08:29

## 2018-03-04 RX ADMIN — CALCIUM CARBONATE (ANTACID) CHEW TAB 500 MG 200 MG: 500 CHEW TAB at 11:18

## 2018-03-04 RX ADMIN — CALCIUM CARBONATE (ANTACID) CHEW TAB 500 MG 200 MG: 500 CHEW TAB at 17:28

## 2018-03-04 RX ADMIN — ONDANSETRON 4 MG: 4 TABLET, ORALLY DISINTEGRATING ORAL at 12:49

## 2018-03-04 RX ADMIN — ONDANSETRON 4 MG: 4 TABLET, ORALLY DISINTEGRATING ORAL at 08:33

## 2018-03-04 RX ADMIN — Medication 1 TABLET: at 08:29

## 2018-03-04 RX ADMIN — ONDANSETRON 4 MG: 4 TABLET, ORALLY DISINTEGRATING ORAL at 17:28

## 2018-03-04 NOTE — PROGRESS NOTES
Problem: Depressed Mood (Adult/Pediatric)  Goal: *STG: Remains safe in hospital  Outcome: Progressing Towards Goal  Pt out in milieu with peers smiling and coloring pictures. Affect much brighter. Appetite is good. Staff will continue to monitor q 15 min checks.

## 2018-03-04 NOTE — BH NOTES
GROUP THERAPY PROGRESS NOTE    Yue Manuel is participating in [place].      Group time: 20 minutes    Personal goal for participation: no goal    Goal orientation: community    Group therapy participation: minimal    Therapeutic interventions reviewed and discussed: yes    Impression of participation: minimal

## 2018-03-04 NOTE — BH NOTES
Behavioral Health Interdisciplinary Rounds     Patient Name: Jovanni Sevilla  Age: 33 y.o.   Room/Bed:  726/  Primary Diagnosis: Depression   Admission Status: Voluntary Commitment     Readmission within 30 days: no  Power of  in place: no  Patient requires a blocked bed: no          Reason for blocked bed:     VTE Prophylaxis: No  Flu vaccine given : no   Mobility needs/Fall risk: no    Nutritional Plan: no  Consults:          Labs/Testing due today?: no    Sleep hours:  7.45      Participation in Care/Groups:  no  Medication Compliant?: Yes  PRNS (last 24 hours): MOM, zofran    Restraints (last 24 hours):  no  Substance Abuse:  yes  CIWA (range last 24 hours):  COWS (range last 24 hours):   Alcohol screening (AUDIT) completed -  AUDIT Score: 0  If applicable, date SBIRT discussed in treatment team AND documented:   Tobacco - patient is a smoker: yes   Date tobacco education completed by RN: 2/28/18  24 hour chart check complete: yes     Patient goal(s) for today:   Treatment team focus/goals:   Progress note     LOS:  5  Expected LOS:     Financial concerns/prescription coverage:   Date of last family contact:       Family requesting physician contact today:    Discharge plan:   Guns in the home:         Outpatient provider(s):     Participating treatment team members: Jovanni Sevilla, * (assigned SW),

## 2018-03-04 NOTE — PROGRESS NOTES
Problem: Depressed Mood (Adult/Pediatric)  Goal: *STG: Participates in treatment plan  Outcome: Progressing Towards Goal  Pt. Met with Dr. Dallas Boone  In treatment team   Smiling  Stated less depressed   Goal: *STG: Attends activities and groups  Outcome: Progressing Towards Goal  Attending groups   Sharing feelings  Goal: *STG: Complies with medication therapy  Outcome: Progressing Towards Goal  Medication compliant  Reviewed in treatment team  Goal: Interventions  Outcome: Progressing Towards Goal   Will continue to monitor  On 15 min. Checks for safety  Assess  Depression   Medication compliance  Effectiveness  Encourage groups    Problem: Falls - Risk of  Goal: *Absence of Falls  Document Jaycee Fall Risk and appropriate interventions in the flowsheet.    Outcome: Progressing Towards Goal  Fall Risk Interventions:     Non slip socks  Bed in low position       Medication Interventions: Teach patient to arise slowly

## 2018-03-05 VITALS
SYSTOLIC BLOOD PRESSURE: 136 MMHG | HEART RATE: 70 BPM | TEMPERATURE: 98.5 F | BODY MASS INDEX: 25.96 KG/M2 | WEIGHT: 191.7 LBS | DIASTOLIC BLOOD PRESSURE: 65 MMHG | RESPIRATION RATE: 16 BRPM | OXYGEN SATURATION: 100 % | HEIGHT: 72 IN

## 2018-03-05 PROCEDURE — 74011250637 HC RX REV CODE- 250/637: Performed by: PSYCHIATRY & NEUROLOGY

## 2018-03-05 PROCEDURE — 74011250637 HC RX REV CODE- 250/637: Performed by: HOSPITALIST

## 2018-03-05 RX ORDER — SWAB
1 SWAB, NON-MEDICATED MISCELLANEOUS DAILY
Qty: 30 TAB | Refills: 0 | Status: SHIPPED | OUTPATIENT
Start: 2018-03-06 | End: 2022-01-14

## 2018-03-05 RX ORDER — SERTRALINE HYDROCHLORIDE 25 MG/1
25 TABLET, FILM COATED ORAL DAILY
Qty: 30 TAB | Refills: 0 | Status: SHIPPED | OUTPATIENT
Start: 2018-03-06 | End: 2022-01-14

## 2018-03-05 RX ADMIN — ONDANSETRON 4 MG: 4 TABLET, ORALLY DISINTEGRATING ORAL at 08:32

## 2018-03-05 RX ADMIN — Medication 1 TABLET: at 08:30

## 2018-03-05 RX ADMIN — CALCIUM CARBONATE (ANTACID) CHEW TAB 500 MG 200 MG: 500 CHEW TAB at 12:03

## 2018-03-05 RX ADMIN — SERTRALINE HYDROCHLORIDE 25 MG: 50 TABLET ORAL at 08:30

## 2018-03-05 RX ADMIN — CALCIUM CARBONATE (ANTACID) CHEW TAB 500 MG 200 MG: 500 CHEW TAB at 08:29

## 2018-03-05 NOTE — DISCHARGE SUMMARY
PSYCHIATRIC DISCHARGE SUMMARY         IDENTIFICATION:    Patient Name  Veronica Le   Date of Birth 1990   [facility] [id number]   Medical Record Number  [id number]      Age  33 y.o. PCP Tena Coleman MD   Admit date:  2018    Discharge date: 3/5/2018   Room Number  726/02  @ [facility]   Date of Service  3/5/2018               TYPE OF DISCHARGE: REGULAR               CONDITION AT DISCHARGE: good       PROVISIONAL & DISCHARGE DIAGNOSES:    Problem List  Date Reviewed: 2018          Codes Class    Hypertension ICD-10-CM: I10  ICD-9-CM: 401.9         * (Principal)Depression ICD-10-CM: F32.9  ICD-9-CM: [address] Problems    *Depression        DISCHARGE DIAGNOSIS:   Axis I:  SEE ABOVE  Axis II: SEE ABOVE  Axis III: SEE ABOVE  Axis IV:  lack of structure  Axis V:  60 on admission, 70 on discharge 70(baseline)       CC & HISTORY OF PRESENT ILLNESS:  33year old AA female admitted for SI/depresssion. Pt was started on Zoloft with symptom remission. At discharge she denied SI/HI intyent and plan and did not meet TDO criteria. SOCIAL HISTORY:    Social History     Social History    Marital status: SINGLE     Spouse name: N/A    Number of children: N/A    Years of education: N/A     Occupational History    Not on file. Social History Main Topics    Smoking status: Current Every Day Smoker     Packs/day: 1.50     Years: 0.50    Smokeless tobacco: Never Used    Alcohol use No      Comment: occassionally    Drug use: Yes     Special: Marijuana    Sexual activity: Yes     Partners: Male     Birth control/ protection: None     Other Topics Concern    Not on file     Social History Narrative    33year old single AA female  (currently 3.5 months preganant). Pt reported SI- dueing her court mandated substance abuse treatment session. She was TDO'd. Pt has a hx of THC use DAILY since age 6. She has 3 children- ages 1,2, and 3. Pt completed the 9th grade. She is on probations (with a future court date for another offense) for possession of controlled substances. FAMILY HISTORY:   Family History   Problem Relation Age of Onset    Diabetes Mother     Hypertension Mother     Diabetes Maternal Grandfather     Hypertension Maternal Grandfather              HOSPITALIZATION COURSE:    Lorraine Michelle was admitted to the inpatient psychiatric unit [facility] for acute psychiatric stabilization in regards to symptomatology as described in the HPI above. The differential diagnosis at time of admission included: depression . While on the unit Lorraine Michelle was involved in individual, group, occupational and milieu therapy. Psychiatric medications were adjusted during this hospitalization including zoloft. Lorraine Michelle demonstrated a slow, but progressive improvement in overall condition. Much of patient's depression appeared to be related to situational stressors and psychological factors. Please see individual progress notes for more specific details regarding patient's hospitalization course. At time of dc, Lorraine Michelle was without significant problems with depression psychosis  mauro. Overall presentation at time of discharge is most consistent with the diagnosis of adjustment disorder with depressed mood. tient with request for discharge today. There are no grounds to seek a TDO. Patient has maximized benefit to be derived from acute inpatient psychiatric treatment.   All members of the treatment team concur with each other in regards to plans for discharge today per patient's request.          LABS AND IMAGAING:    Labs Reviewed   TSH 3RD GENERATION - Abnormal; Notable for the following:        Result Value    TSH 0.29 (*)     All other components within normal limits   METABOLIC PANEL, BASIC - Abnormal; Notable for the following:     BUN/Creatinine ratio 10 (*)     All other components within normal limits   BETA HCG, QT - Abnormal; Notable for the following:     [place] [address] (*)     All other components within normal limits     Admission on 02/27/2018   Component Date Value Ref Range Status    TSH 02/28/2018 0.29* 0.36 - 3.74 uIU/mL Final    Sodium 02/28/2018 137  136 - 145 mmol/L Final    Potassium 02/28/2018 4.0  3.5 - 5.1 mmol/L Final    Chloride 02/28/2018 107  97 - 108 mmol/L Final    CO2 02/28/2018 25  21 - 32 mmol/L Final    Anion gap 02/28/2018 5  5 - 15 mmol/L Final    Glucose 02/28/2018 73  65 - 100 mg/dL Final    BUN 02/28/2018 6  6 - 20 MG/DL Final    Creatinine 02/28/2018 0.61  0.55 - 1.02 MG/DL Final    BUN/Creatinine ratio 02/28/2018 10* 12 - 20   Final    GFR est AA 02/28/2018 >60  >60 ml/min/1.73m2 Final    GFR est non-AA 02/28/2018 >60  >60 ml/min/1.73m2 Final    Calcium 02/28/2018 8.8  8.5 - 10.1 MG/DL Final    Beta HCG, QT 02/28/2018 32334* 0 - 6 MIU/ML Final   Admission on 02/27/2018, Discharged on 02/27/2018   Component Date Value Ref Range Status    ALCOHOL(ETHYL),SERUM 02/27/2018 <10  <10 MG/DL Final    WBC 02/27/2018 10.3  3.6 - 11.0 K/uL Final    RBC 02/27/2018 3.71* 3.80 - 5.20 M/uL Final    HGB 02/27/2018 11.6  11.5 - 16.0 g/dL Final    HCT 02/27/2018 33.3* 35.0 - 47.0 % Final    MCV 02/27/2018 89.8  80.0 - 99.0 FL Final    MCH 02/27/2018 31.3  26.0 - 34.0 PG Final    MCHC 02/27/2018 34.8  30.0 - 36.5 g/dL Final    RDW 02/27/2018 13.6  11.5 - 14.5 % Final    PLATELET 24/07/9059 399  150 - 400 K/uL Final    MPV 02/27/2018 9.7  8.9 - 12.9 FL Final    NRBC 02/27/2018 0.0  0  WBC Final    ABSOLUTE NRBC 02/27/2018 0.00  0.00 - 0.01 K/uL Final    NEUTROPHILS 02/27/2018 55  32 - 75 % Final    LYMPHOCYTES 02/27/2018 39  12 - 49 % Final    MONOCYTES 02/27/2018 4* 5 - 13 % Final    EOSINOPHILS 02/27/2018 1  0 - 7 % Final    BASOPHILS 02/27/2018 0  0 - 1 % Final    IMMATURE GRANULOCYTES 02/27/2018 0  0.0 - 0.5 % Final    ABS.  NEUTROPHILS 02/27/2018 5.7  1.8 - 8.0 K/UL Final    ABS. LYMPHOCYTES 02/27/2018 4.0* 0.8 - 3.5 K/UL Final    ABS. MONOCYTES 02/27/2018 0.4  0.0 - 1.0 K/UL Final    ABS. EOSINOPHILS 02/27/2018 0.1  0.0 - 0.4 K/UL Final    ABS. BASOPHILS 02/27/2018 0.0  0.0 - 0.1 K/UL Final    ABS. IMM.  GRANS. 02/27/2018 0.0  0.00 - 0.04 K/UL Final    DF 02/27/2018 AUTOMATED    Final    AMPHETAMINES 02/27/2018 NEGATIVE   NEG   Final    BARBITURATES 02/27/2018 NEGATIVE   NEG   Final    BENZODIAZEPINES 02/27/2018 NEGATIVE   NEG   Final    COCAINE 02/27/2018 NEGATIVE   NEG   Final    METHADONE 02/27/2018 NEGATIVE   NEG   Final    OPIATES 02/27/2018 NEGATIVE   NEG   Final    PCP(PHENCYCLIDINE) 02/27/2018 NEGATIVE   NEG   Final    THC (TH-CANNABINOL) 02/27/2018 POSITIVE* NEG   Final    Drug screen comment 02/27/2018 (NOTE)   Final    Color 02/27/2018 YELLOW/STRAW    Final    Appearance 02/27/2018 CLEAR  CLEAR   Final    Specific gravity 02/27/2018 1.025  1.003 - 1.030   Final    pH (UA) 02/27/2018 6.0  5.0 - 8.0   Final    Protein 02/27/2018 NEGATIVE   NEG mg/dL Final    Glucose 02/27/2018 NEGATIVE   NEG mg/dL Final    Ketone 02/27/2018 NEGATIVE   NEG mg/dL Final    Bilirubin 02/27/2018 NEGATIVE   NEG   Final    Blood 02/27/2018 NEGATIVE   NEG   Final    Urobilinogen 02/27/2018 1.0  0.2 - 1.0 EU/dL Final    Nitrites 02/27/2018 NEGATIVE   NEG   Final    Leukocyte Esterase 02/27/2018 NEGATIVE   NEG   Final    WBC 02/27/2018 0-4  0 - 4 /hpf Final    RBC 02/27/2018 0-5  0 - 5 /hpf Final    Epithelial cells 02/27/2018 FEW  FEW /lpf Final    Bacteria 02/27/2018 NEGATIVE   NEG /hpf Final    UA:UC IF INDICATED 02/27/2018 CULTURE NOT INDICATED BY UA RESULT  CNI   Final    Mucus 02/27/2018 2+* NEG /lpf Final    Sodium 02/27/2018 137  136 - 145 mmol/L Final    Potassium 02/27/2018 4.0  3.5 - 5.1 mmol/L Final    Chloride 02/27/2018 103  97 - 108 mmol/L Final    CO2 02/27/2018 26  21 - 32 mmol/L Final    Anion gap 02/27/2018 8  5 - 15 mmol/L Final    Glucose 02/27/2018 68  65 - 100 mg/dL Final    BUN 02/27/2018 5* 6 - 20 MG/DL Final    Creatinine 02/27/2018 0.61  0.55 - 1.02 MG/DL Final    BUN/Creatinine ratio 02/27/2018 8* 12 - 20   Final    GFR est AA 02/27/2018 >60  >60 ml/min/1.73m2 Final    GFR est non-AA 02/27/2018 >60  >60 ml/min/1.73m2 Final    Calcium 02/27/2018 9.3  8.5 - 10.1 MG/DL Final    Bilirubin, total 02/27/2018 0.2  0.2 - 1.0 MG/DL Final    ALT (SGPT) 02/27/2018 7* 12 - 78 U/L Final    AST (SGOT) 02/27/2018 10* 15 - 37 U/L Final    Alk. phosphatase 02/27/2018 106  45 - 117 U/L Final    Protein, total 02/27/2018 7.6  6.4 - 8.2 g/dL Final    Albumin 02/27/2018 3.2* 3.5 - 5.0 g/dL Final    Globulin 02/27/2018 4.4* 2.0 - 4.0 g/dL Final    A-G Ratio 02/27/2018 0.7* 1.1 - 2.2   Final    Acetaminophen level 02/27/2018 <2* 10 - 30 ug/mL Final    Salicylate level 65/30/4314 2.7* 2.8 - 20.0 MG/DL Final    Ventricular Rate 02/27/2018 66  BPM Final    Atrial Rate 02/27/2018 66  BPM Final    P-R Interval 02/27/2018 156  ms Final    QRS Duration 02/27/2018 78  ms Final    Q-T Interval 02/27/2018 402  ms Final    QTC Calculation (Bezet) 02/27/2018 421  ms Final    Calculated P Axis 02/27/2018 31  degrees Final    Calculated R Axis 02/27/2018 16  degrees Final    Calculated T Axis 02/27/2018 39  degrees Final    Diagnosis 02/27/2018    Final                    Value:Normal sinus rhythm with sinus arrhythmia  Normal ECG  No previous ECGs available  Confirmed by Reema Baeza MD, Marisela Locke ([id number]) on 2/28/2018 10:00:30 AM      Pregnancy test,urine (POC) 02/27/2018 POSITIVE* NEG   Final     No results found. DISPOSITION:    Home. Patient to f/u with o/p psychiatric, and psychotherapy appointments. Patient is to f/u with internist as directed. .               FOLLOW-UP CARE:    Activity as tolerated  Regular Diet  Wound Care: none needed.   Follow-up Information     Follow up With Details Comments [facility]   [id number] [facility] [place]  [address], [address] [facility] [address] [facility]                   PROGNOSIS:  Greatly dependent upon patient's ability to remain sober and to f/u with o/p psychiatric/psychotherapy appointments as well as to comply with psychiatric medications as prescribed. Patient denies suicidal or homicidal ideations. Emma Oquendo fully contracts for safety. Patient reports many positive predictive factors in terms of not attempting suicide or homicide. Patient appears to be at low risk of suicide or homicide. Patient and family are aware and in agreement with discharge and discharge plan. DISCHARGE MEDICATIONS: (no changes made). Informed consent given for the use of following psychotropic medications:  Current Discharge Medication List      START taking these medications    Details   sertraline (ZOLOFT) 25 mg tablet Take 1 Tab by mouth daily. Indications: ANXIETY WITH DEPRESSION  Qty: 30 Tab, Refills: 0      prenatal vit-iron fumarate-fa (PRENATAL PLUS WITH IRON) 28 mg iron- 800 mcg tab Take 1 Tab by mouth daily. Indications: pregnancy  Qty: 30 Tab, Refills: 0                    A coordinated, multidisplinary treatment team round was conducted with Anthony Oquendo---this is done daily here at [facility] . This team consists of the nurse, psychiatric unit pharmcist,  and writer. I have spent greater than 35 minutes on discharge work.     Signed:  Jose Alberto Jackson MD  3/5/2018

## 2018-03-05 NOTE — DISCHARGE INSTRUCTIONS
DISCHARGE SUMMARY    NAME:Emma Oquendo  : 1990  MRN: [id number]    The patient Tanesha Alvarenga exhibits the ability to control behavior in a less restrictive environment. Patient's level of functioning is improving. No assaultive/destructive behavior has been observed for the past 24 hours. No suicidal/homicidal threat or behavior has been observed for the past 24 hours. There is no evidence of serious medication side effects. Patient has not been in physical or protective restraints for at least the past 24 hours. If weapons involved, how are they secured? No weapons involved     Is patient aware of and in agreement with discharge plan? Patient is aware of discharge and is in agreement     Arrangements for medication:  Prescriptions filled per keely . Referral for substance abuse treatment ? Yes, 3/6/2018 at 8:30     Referral for smoking cessation needed ? No     Copy of discharge instructions to  provider?:  Yes, fax to [phone]     Arrangements for transportation home:  Patient to arrange     Keep all follow up appointments as scheduled, continue to take prescribed medications per physician instructions. Mental health crisis number:  [phone] or your local mental health crisis line number at [address] [facility] from Nurse    PATIENT INSTRUCTIONS:  What to do at Home:  Recommended activity: Activity as tolerated. If you experience any of the following symptoms hopeless helpless overwhelming thoughts of harming self or others, please call 911 or your local mental health hotline [place] [phone], [address]. *  Please give a list of your current medications to your Primary Care Provider. *  Please update this list whenever your medications are discontinued, doses are      changed, or new medications (including over-the-counter products) are added. *  Please carry medication information at all times in case of emergency situations.     These are general instructions for a healthy lifestyle:    No smoking/ No tobacco products/ Avoid exposure to second hand smoke  Surgeon General's Warning:  Quitting smoking now greatly reduces serious risk to your health. Obesity, smoking, and sedentary lifestyle greatly increases your risk for illness    A healthy diet, regular physical exercise & weight monitoring are important for maintaining a healthy lifestyle    You may be retaining fluid if you have a history of heart failure or if you experience any of the following symptoms:  Weight gain of 3 pounds or more overnight or 5 pounds in a week, increased swelling in our hands or feet or shortness of breath while lying flat in bed. Please call your doctor as soon as you notice any of these symptoms; do not wait until your next office visit. Recognize signs and symptoms of STROKE:    F-face looks uneven    A-arms unable to move or move unevenly    S-speech slurred or non-existent    T-time-call 911 as soon as signs and symptoms begin-DO NOT go       Back to bed or wait to see if you get better-TIME IS BRAIN. Warning Signs of HEART ATTACK     Call 911 if you have these symptoms:   Chest discomfort. Most heart attacks involve discomfort in the center of the chest that lasts more than a few minutes, or that goes away and comes back. It can feel like uncomfortable pressure, squeezing, fullness, or pain.  Discomfort in other areas of the upper body. Symptoms can include pain or discomfort in one or both arms, the back, neck, jaw, or stomach.  Shortness of breath with or without chest discomfort.  Other signs may include breaking out in a cold sweat, nausea, or lightheadedness. Don't wait more than five minutes to call 911 - MINUTES MATTER! Fast action can save your life. Calling 911 is almost always the fastest way to get lifesaving treatment.  Emergency Medical Services staff can begin treatment when they arrive -- up to an hour sooner than if someone gets to the hospital by car. The discharge information has been reviewed with the patient. The patient verbalized understanding. Discharge medications reviewed with the patient and appropriate educational materials and side effects teaching were provided.   ___________________________________________________________________________________________________________________________________

## 2018-03-05 NOTE — BH NOTES
GROUP THERAPY PROGRESS NOTE    Emma Oquendo did not participate in a 65 minute Acute Unit's Process Group, with a focus identifying feelings, planning for the rest of the day, and discussing discharge. Her discharge process and discharge were completed during this group.

## 2018-03-05 NOTE — PROGRESS NOTES
Problem: Depressed Mood (Adult/Pediatric)  Goal: *STG: Demonstrates reduction in symptoms and increase in insight into coping skills/future focused  Outcome: Progressing Towards Goal  Affect is bright. Laughing and interacting with peers. Goal: *STG: Remains safe in hospital  Outcome: Progressing Towards Goal  Pt denies any suicidal or homicidal thoughts. Contracts for safety. Remains on q 15 min safety checks. Goal: *STG: Complies with medication therapy  Outcome: Progressing Towards Goal  Has been medication compliant. Verbalized understanding of medications and stated \"I know I needed something to help with my depression. \"    Problem: Falls - Risk of  Goal: *Absence of Falls  Document Jaycee Fall Risk and appropriate interventions in the flowsheet. Outcome: Progressing Towards Goal  Fall Risk Interventions:Gait observed as steady. No fall history reported. Wearing slip resistant footwear.             Medication Interventions: Teach patient to arise slowly

## 2018-03-05 NOTE — BH NOTES
PSYCHIATRIC PROGRESS NOTE         Patient Name  Edelmira David   Date of Birth 1990   [facility] [id number]   Medical Record Number  [id number]      Age  33 y.o. PCP Stewart Solano MD   Admit date:  2/27/2018    Room Number  726/02  @ [facility]   Date of Service  3/4/2018          PSYCHOTHERAPY SESSION NOTE:  Length of psychotherapy session: 15 minutes    Main condition/diagnosis/issues treated during session today, 3/4/2018 : depression    I employed Cognitive Behavioral therapy techniques, Reality-Oriented psychotherapy, as well as supportive psychotherapy in regards to various ongoing psychosocial stressors, including the following: pre-admission and current problems; housing issues; occupational issues; academic issues; egal issues; medical issues; and stress of hospitalization. Interpersonal relationship issues and psychodynamic conflicts explored. Attempts made to alleviate maladaptive patterns. We, also, worked on issues of denial & effects of substance dependency/use     Overall, patient is  progressing    Treatment Plan Update (reviewed an updated 3/4/2018) : I will modify psychotherapy tx plan by implementing more stress management strategies, building upon cognitive behavioral techniques, increasing coping skills, as well as shoring up psychological defenses). An extended energy and skill set was needed to engage pt in psychotherapy due to some of the following: resistiveness, complexity, negativity, confrontational nature, hostile behaviors, and/or severe abnormalities in thought processes/psychosis resulting in the loss of expressive/receptive language communication skills. E & M PROGRESS NOTE:         HISTORY       CC:  \"depression\"  HISTORY OF PRESENT ILLNESS/INTERVAL HISTORY:  (reviewed/updated 3/4/2018). per initial evaluation:   The patient, Edlemira David, is a 33 y.o.   BLACK OR  female with a past psychiatric history significant for dubstance induced mood d/o , who presents at this time with complaints of (and/or evidence of) the following emotional symptoms: depression and suicidal thoughts/threats. Additional symptomatology include anxiety. The above symptoms have been present for years. These symptoms are of severe severity. These symptoms are constant  in nature. The patient's condition has been precipitated by ambivalence about current pregnancy and psychosocial stressors (upcoming court date ). Patient's condition made worse by continued illicit drug use as well as treatment noncompliance. UDS: +MJ ; BAL=0. Emma Oquendo presents/reports/evidences the following emotional symptoms today, 3/4/2018:depression. The above symptoms have been present for several days to a week. These symptoms are of moderate/ improving severity. The symptoms are intermittent/ fleeting in nature. Additional symptomatology and features include much improved depression. Pt presented in elated mood and with bright affect, smiling and stated she felt great. Only on 25 mg of Sertraline. No SI or AVH. 3/4- Pt has been attending groups and has been doing well. She feels that she is ready to go back to her home, has a good support system. SIDE EFFECTS: (reviewed/updated 3/4/2018)  None reported or admitted to. ALLERGIES:(reviewed/updated 3/4/2018)  Allergies   Allergen Reactions    Other Food Anaphylaxis     ONION    Onion Anaphylaxis      MEDICATIONS PRIOR TO ADMISSION:(reviewed/updated 3/4/2018)  No prescriptions prior to admission. PAST MEDICAL HISTORY: Past medical history from the initial psychiatric evaluation has been reviewed (reviewed/updated 3/4/2018) with no additional updates (I asked patient and no additional past medical history provided).    Past Medical History:   Diagnosis Date    Hypertension     Other ill-defined conditions(867.24)     muscle spasms    Other ill-defined conditions(639.89)     a fib    Other ill-defined conditions(799.89)     angina     Past Surgical History:   Procedure Laterality Date    HX ORTHOPAEDIC      left knee      SOCIAL HISTORY: Social history from the initial psychiatric evaluation has been reviewed (reviewed/updated 3/4/2018) with no additional updates (I asked patient and no additional social history provided). Social History     Social History    Marital status: SINGLE     Spouse name: N/A    Number of children: N/A    Years of education: N/A     Occupational History    Not on file. Social History Main Topics    Smoking status: Current Every Day Smoker     Packs/day: 1.50     Years: 0.50    Smokeless tobacco: Never Used    Alcohol use No      Comment: occassionally    Drug use: Yes     Special: Marijuana    Sexual activity: Yes     Partners: Male     Birth control/ protection: None     Other Topics Concern    Not on file     Social History Narrative    33year old single AA female  (currently 3.5 months preganant). Pt reported SI- dueing her court mandated substance abuse treatment session. She was TDO'd. Pt has a hx of THC use DAILY since age 6. She has 3 children- ages 1,2, and 3. Pt completed the 9th grade. She is on probations (with a future court date for another offense) for possession of controlled substances. FAMILY HISTORY: Family history from the initial psychiatric evaluation has been reviewed (reviewed/updated 3/4/2018) with no additional updates (I asked patient and no additional family history provided).    Family History   Problem Relation Age of Onset    Diabetes Mother     Hypertension Mother     Diabetes Maternal Grandfather     Hypertension Maternal Grandfather        REVIEW OF SYSTEMS: (reviewed/updated 3/4/2018)  Appetite:good   Sleep: good   All other Review of Systems: Negative          MENTAL Iris [address] (MSE):    MSE FINDINGS ARE WITHIN NORMAL LIMITS (WNL) UNLESS OTHERWISE STATED BELOW. ( ALL OF THE BELOW CATEGORIES OF THE MSE HAVE BEEN REVIEWED (reviewed 3/4/2018) AND UPDATED AS DEEMED APPROPRIATE )  General Presentation age appropriate and casually dressed, cooperative   Orientation oriented to time, place and person   Vital Signs  See below (reviewed 3/4/2018); Vital Signs (BP, Pulse, & Temp) are within normal limits if not listed below. Gait and Station Stable/steady, no ataxia   Musculoskeletal System No extrapyramidal symptoms (EPS); no abnormal muscular movements or Tardive Dyskinesia (TD); muscle strength and tone are within normal limits   Language No aphasia or dysarthria   Speech:  normal pitch and normal volume   Thought Processes logical; normal rate of thoughts; fair abstract reasoning/computation   Thought Associations goal directed   Thought Content not internally preoccupied   Suicidal Ideations none   Homicidal Ideations none   Mood:  elated   Affect:  bright   Memory recent  fair   Memory remote:  fair   Concentration/Attention:  distractable   Fund of Knowledge avg   Insight:  fair   Reliability fair   Judgment:  good          VITALS:     Patient Vitals for the past 24 hrs:   Temp Pulse Resp BP SpO2   03/04/18 1620 98.6 °F (37 °C) (!) 58 16 (!) 137/91 98 %   03/04/18 0800 98.5 °F (36.9 °C) (!) 56 16 121/74 100 %   03/03/18 2001 98.5 °F (36.9 °C) 82 16 153/87 -     Wt Readings from Last 3 Encounters:   03/03/18 87 kg (191 lb 11.2 oz)   02/27/18 96.2 kg (212 lb)   12/05/17 95.3 kg (210 lb)     Temp Readings from Last 3 Encounters:   03/04/18 98.6 °F (37 °C)   02/27/18 98.2 °F (36.8 °C)   12/05/17 98.8 °F (37.1 °C)     BP Readings from Last 3 Encounters:   03/04/18 (!) 137/91   02/27/18 113/53   12/05/17 (!) 170/131     Pulse Readings from Last 3 Encounters:   03/04/18 (!) 58   02/27/18 63   12/05/17 84            DATA     LABORATORY DATA:(reviewed/updated 3/4/2018)  No results found for this or any previous visit (from the past 24 hour(s)).   No results found for: VALF2, VALAC, VALP, VALPR, DS6, CRBAM, CRBAMP, CARB2, XCRBAM  No results found for: LITHM   RADIOLOGY REPORTS:(reviewed/updated 3/4/2018)  No results found.        MEDICATIONS     ALL MEDICATIONS:   Current Facility-Administered Medications   Medication Dose Route Frequency    sertraline (ZOLOFT) tablet 25 mg  25 mg Oral DAILY    cloNIDine HCl (CATAPRES) tablet 0.1 mg  0.1 mg Oral Q8H PRN    calcium carbonate (TUMS) chewable tablet 200 mg [elemental]  200 mg Oral TID WITH MEALS    prenatal vit-iron fumarate-fa (PRENATAL PLUS with IRON) tablet 1 Tab  1 Tab Oral DAILY    ondansetron (ZOFRAN ODT) tablet 4 mg  4 mg Oral Q8H PRN    ziprasidone (GEODON) 20 mg in sterile water (preservative free) 1 mL injection  20 mg IntraMUSCular BID PRN    OLANZapine (ZyPREXA) tablet 5 mg  5 mg Oral Q6H PRN    benztropine (COGENTIN) tablet 2 mg  2 mg Oral BID PRN    benztropine (COGENTIN) injection 2 mg  2 mg IntraMUSCular BID PRN    acetaminophen (TYLENOL) tablet 650 mg  650 mg Oral Q4H PRN    magnesium hydroxide (MILK OF MAGNESIA) 400 mg/5 mL oral suspension 30 mL  30 mL Oral DAILY PRN    nicotine (NICODERM CQ) 21 mg/24 hr patch 1 Patch  1 Patch TransDERmal DAILY PRN      SCHEDULED MEDICATIONS:   Current Facility-Administered Medications   Medication Dose Route Frequency    sertraline (ZOLOFT) tablet 25 mg  25 mg Oral DAILY    calcium carbonate (TUMS) chewable tablet 200 mg [elemental]  200 mg Oral TID WITH MEALS    prenatal vit-iron fumarate-fa (PRENATAL PLUS with IRON) tablet 1 Tab  1 Tab Oral DAILY          ASSESSMENT & PLAN     DIAGNOSES REQUIRING ACTIVE TREATMENT AND MONITORING: (reviewed/updated 3/4/2018)  Patient Active Hospital Problem List:   Depression (2/27/2018)    Assessment: improved    Plan: continued inpatient admission for further stabilization  Awaiting csu bed  Continue zoloft 25mg daily    Pregnant  Assessment: stable  Continue pnv            I will continue to monitor blood levels (Depakote, Tegretol, lithium, clozapine---a drug with a narrow therapeutic index= NTI) and associated labs for drug therapy implemented that require intense monitoring for toxicity as deemed appropriate based on current medication side effects and pharmacodynamically determined drug 1/2 lives. In summary, Lorraine Michelle, is a 33 y.o.  female who presents with a severe exacerbation of the principal diagnosis of Depression  Patient's condition is improving. Patient requires continued inpatient hospitalization for further stabilization, safety monitoring and medication management. I will continue to coordinate the provision of individual, milieu, occupational, group, and substance abuse therapies to address target symptoms/diagnoses as deemed appropriate for the individual patient. A coordinated, multidisplinary treatment team round was conducted with the patient (this team consists of the nurse, psychiatric unit pharmcist,  and writer). Complete current electronic health record for patient has been reviewed today including consultant notes, ancillary staff notes, nurses and psychiatric tech notes. Suicide risk assessment completed and patient deemed to be of low risk for suicide at this time. The following regarding medications was addressed during rounds with patient:   the risks and benefits of the proposed medication. The patient was given the opportunity to ask questions. Informed consent given to the use of the above medications. Will continue to adjust psychiatric and non-psychiatric medications (see above \"medication\" section and orders section for details) as deemed appropriate & based upon diagnoses and response to treatment. I will continue to order blood tests/labs and diagnostic tests as deemed appropriate and review results as they become available (see orders for details and above listed lab/test results).     I will order psychiatric records from previous [facility] hospitals to further elucidate the nature of patient's psychopathology and review once available. I will gather additional collateral information from friends, family and o/p treatment team to further elucidate the nature of patient's psychopathology and baselline level of psychiatric functioning. I certify that this patient's inpatient psychiatric hospital services furnished since the previous certification were, and continue to be, required for treatment that could reasonably be expected to improve the patient's condition, or for diagnostic study, and that the patient continues to need, on a daily basis, active treatment furnished directly by or requiring the supervision of inpatient psychiatric facility personnel. In addition, the hospital records show that services furnished were intensive treatment services, admission or related services, or equivalent services.     EXPECTED DISCHARGE DATE/DAY: TBD     DISPOSITION: Home       Signed By:   Lalo Camejo MD  3/4/2018

## 2018-03-05 NOTE — INTERDISCIPLINARY ROUNDS
Behavioral Health Interdisciplinary Rounds     Patient Name: Luz Brantley  Age: 33 y.o.   Room/Bed:  726/02  Primary Diagnosis: Depression   Admission Status: Voluntary Commitment     Readmission within 30 days: no  Power of  in place: no  Patient requires a blocked bed: no          Reason for blocked bed:     VTE Prophylaxis: No  Flu vaccine given : no   Mobility needs/Fall risk: no    Nutritional Plan: no  Consults:          Labs/Testing due today?: no    Sleep hours:  6.45      Participation in Care/Groups:  yes  Medication Compliant?: Yes  PRNS (last 24 hours): None    Restraints (last 24 hours):  no  Substance Abuse:  yes  CIWA (range last 24 hours):  COWS (range last 24 hours):   Alcohol screening (AUDIT) completed -  AUDIT Score: 0  If applicable, date SBIRT discussed in treatment team AND documented:   Tobacco - patient is a smoker: no   Date tobacco education completed by RN: 02/28/18  24 hour chart check complete: yes     Patient goal(s) for today:   Treatment team focus/goals: Plan for discharge today   Progress note Plan for discharge today     LOS:  5  Expected LOS: plan for discharge today     Financial concerns/prescription coverage:  no  Date of last family contact: 03/04/18      Family requesting physician contact today:  no  Discharge plan: she will return home with family   Guns in the home:  no       Outpatient provider(s): [facility]     Participating treatment team members: Cathay Cram - Dr. Celine Hamman - Viann Locust

## 2018-03-05 NOTE — BH NOTES
DISCHARGE SUMMARY from Nurse    PATIENT INSTRUCTIONS:    What to do at Home:  Recommended activity: Activity as tolerated,         *  Please give a list of your current medications to your Primary Care Provider. *  Please update this list whenever your medications are discontinued, doses are      changed, or new medications (including over-the-counter products) are added. *  Please carry medication information at all times in case of emergency situations. These are general instructions for a healthy lifestyle:    No smoking/ No tobacco products/ Avoid exposure to second hand smoke  Surgeon General's Warning:  Quitting smoking now greatly reduces serious risk to your health. Obesity, smoking, and sedentary lifestyle greatly increases your risk for illness    A healthy diet, regular physical exercise & weight monitoring are important for maintaining a healthy lifestyle    You may be retaining fluid if you have a history of heart failure or if you experience any of the following symptoms:  Weight gain of 3 pounds or more overnight or 5 pounds in a week, increased swelling in our hands or feet or shortness of breath while lying flat in bed. Please call your doctor as soon as you notice any of these symptoms; do not wait until your next office visit. Recognize signs and symptoms of STROKE:    F-face looks uneven    A-arms unable to move or move unevenly    S-speech slurred or non-existent    T-time-call 911 as soon as signs and symptoms begin-DO NOT go       Back to bed or wait to see if you get better-TIME IS BRAIN. Warning Signs of HEART ATTACK     Call 911 if you have these symptoms:   Chest discomfort. Most heart attacks involve discomfort in the center of the chest that lasts more than a few minutes, or that goes away and comes back. It can feel like uncomfortable pressure, squeezing, fullness, or pain.  Discomfort in other areas of the upper body.  Symptoms can include pain or discomfort in one or both arms, the back, neck, jaw, or stomach.  Shortness of breath with or without chest discomfort.  Other signs may include breaking out in a cold sweat, nausea, or lightheadedness. Don't wait more than five minutes to call 911 - MINUTES MATTER! Fast action can save your life. Calling 911 is almost always the fastest way to get lifesaving treatment. Emergency Medical Services staff can begin treatment when they arrive -- up to an hour sooner than if someone gets to the hospital by car. The discharge information has been reviewed with the patient. The patient verbalized understanding. Discharge medications reviewed with the patient and appropriate educational materials and side effects teaching were provided. Prescriptions were filled for patient. Patient received all valuables and belongings.   ___________________________________________________________________________________________________________________________________

## 2018-03-05 NOTE — BH NOTES
GROUP THERAPY PROGRESS NOTE    The patient Llewellyn Landau is participating in [employer]. Group time: 30 minutes    Personal goal for participation: to orient the patient to the unit.     Goal orientation: successful adoption of unit rules    Group therapy participation: active    Therapeutic interventions reviewed and discussed: Yes    Impression of participation:     Kenneth Wiggins  3/5/2018 10:19 AM

## 2018-03-05 NOTE — PROGRESS NOTES
Problem: Falls - Risk of  Goal: *Absence of Falls  Document Jaycee Fall Risk and appropriate interventions in the flowsheet. Outcome: Progressing Towards Goal  Fall Risk Interventions:  Patient has been fall free since arriving on the Acute Unit. She is currently sleeping, no stress noted. Patient states she feels much better and is ready to go home.             Medication Interventions: Teach patient to arise slowly

## 2018-03-06 NOTE — BH NOTES
Behavioral Health Transition Record to Provider    Patient Name: Carol Willson  YOB: 1990  Medical Record Number: [id number]  Date of Admission: 2/27/2018  Date of Discharge: 3/5/2018     Attending Provider: No att. providers found  Discharging Provider: Dr. Croft Agent   To contact this individual call  [phone] and ask the  to page. If unavailable, ask to be transferred to [facility] Provider on call. [facility] Provider will be available on call 24/7 and during holidays. Primary Care Provider: Ayla Preciado MD    Allergies   Allergen Reactions    Other Food Anaphylaxis     ONION    Onion Anaphylaxis       Reason for Admission: SI, no plan or intent \". Pt admitted under a temporary long-term order (TDO) severe depression with suicidal ideations  proving to be an imminent danger to self and others and an inability to care for self. Admission Diagnosis: depression  Depression    * No surgery found *    Results for orders placed or performed during the hospital encounter of 02/27/18   TSH 3RD GENERATION   Result Value Ref Range    TSH 0.29 (L) 0.36 - 5.98 uIU/mL   METABOLIC PANEL, BASIC   Result Value Ref Range    Sodium 137 136 - 145 mmol/L    Potassium 4.0 3.5 - 5.1 mmol/L    Chloride 107 97 - 108 mmol/L    CO2 25 21 - 32 mmol/L    Anion gap 5 5 - 15 mmol/L    Glucose 73 65 - 100 mg/dL    BUN 6 6 - 20 MG/DL    Creatinine 0.61 0.55 - 1.02 MG/DL    BUN/Creatinine ratio 10 (L) 12 - 20      GFR est AA >60 >60 ml/min/1.73m2    GFR est non-AA >60 >60 ml/min/1.73m2    Calcium 8.8 8.5 - 10.1 MG/DL   BETA HCG, QT   Result Value Ref [place] [address] (H) 0 - 6 MIU/ML       Immunizations administered during this encounter: There is no immunization history on file for this patient.     Screening for Metabolic Disorders for Patients on Antipsychotic Medications  (Data obtained from the EMR)    Estimated Body Mass Index  Estimated body mass index is 25.29 kg/(m^2) as calculated from the following:    Height as of this encounter: 6' 1\" (1.854 m). Weight as of this encounter: 87 kg (191 lb 11.2 oz). Vital Signs/Blood Pressure  Visit Vitals    /65    Pulse 70    Temp 98.5 °F (36.9 °C)    Resp 16    Ht 6' 1\" (1.854 m)    Wt 87 kg (191 lb 11.2 oz)    LMP 11/14/2017 (Within Days)    SpO2 100%    BMI 25.29 kg/m2       Blood Glucose/Hemoglobin A1c  Lab Results   Component Value Date/Time    Glucose 73 02/28/2018 11:19 AM       No results found for: HBA1C, HGBE8, DLE4UDGC     Lipid Panel  No results found for: CHOL, CHOLX, CHLST, CHOLV, 379584, HDL, LDL, LDLC, DLDLP, TGLX, TRIGL, TRIGP, CHHD, CHHDX     Discharge Diagnosis: Depression   Discharge Plan: She will return home with her family. The patient Se Espinoza exhibits the ability to control behavior in a less restrictive environment. Patient's level of functioning is improving. No assaultive/destructive behavior has been observed for the past 24 hours. No suicidal/homicidal threat or behavior has been observed for the past 24 hours. There is no evidence of serious medication side effects. Patient has not been in physical or protective restraints for at least the past 24 hours. If weapons involved, how are they secured? No weapons involved     Is patient aware of and in agreement with discharge plan? Patient is aware of discharge and is in agreement     Arrangements for medication:  Prescriptions filled per keely . Referral for substance abuse treatment ? Yes, 3/6/2018 at 8:30     Referral for smoking cessation needed ? No     Copy of discharge instructions to  provider?:  Yes, fax to [phone]     Arrangements for transportation home:  Patient to arrange     Keep all follow up appointments as scheduled, continue to take prescribed medications per physician instructions.   Mental health crisis number:  [phone] or your local mental health crisis line number at [phone]           Discharge Medication List and Instructions:   Discharge Medication List as of 3/5/2018 12:40 PM      START taking these medications    Details   sertraline (ZOLOFT) 25 mg tablet Take 1 Tab by mouth daily. Indications: ANXIETY WITH DEPRESSION, Print, Disp-30 Tab, R-0      prenatal vit-iron fumarate-fa (PRENATAL PLUS WITH IRON) 28 mg iron- 800 mcg tab Take 1 Tab by mouth daily. Indications: pregnancy, Print, Disp-30 Tab, R-0             Unresulted Labs     None        To obtain results of studies pending at discharge, please contact [phone]     Follow-up Information     Follow up With Details Comments [address] [facility] On 3/6/2018 [facility] in for services Monday - Friday for rapid assess - 8:30 to 11:00 and 12:00 to 3:00  [address] [facility]  [address] [facility], [facility] [facility]  [phone]            Advanced Directive:   Does the patient have an appointed surrogate decision maker? No  Does the patient have a Medical Advance Directive? No  Does the patient have a Psychiatric Advance Directive? No  If the patient does not have a surrogate or Medical Advance Directive AND Psychiatric Advance Directive, the patient was offered information on these advance directives Patient declined to complete    Patient Instructions: Please continue all medications until otherwise directed by physician. Tobacco Cessation Discharge Plan:   Is the patient a smoker and needs referral for smoking cessation? No  Patient referred to the following for smoking cessation with an appointment? No     Patient was offered medication to assist with smoking cessation at discharge? No  Was education for smoking cessation added to the discharge instructions? Yes    Alcohol/Substance Abuse Discharge Plan:   Does the patient have a history of substance/alcohol abuse and requires a referral for treatment?  Yes  Patient referred to the following for substance/alcohol abuse treatment with an appointment? Yes- 3/6/2018 [facility] at 8:00 am   Patient was offered medication to assist with alcohol cessation at discharge? No  Was education for substance/alcohol abuse added to discharge instructions? No    Patient discharged to Home; discussed with patient/caregiver and provided to the patient/caregiver either in hard copy or electronically.

## 2019-05-26 ENCOUNTER — HOSPITAL ENCOUNTER (EMERGENCY)
Age: 29
Discharge: HOME | End: 2019-05-27
Attending: EMERGENCY MEDICINE
Payer: MEDICAID

## 2019-05-26 VITALS
HEART RATE: 79 BPM | BODY MASS INDEX: 28.44 KG/M2 | TEMPERATURE: 98.1 F | OXYGEN SATURATION: 99 % | SYSTOLIC BLOOD PRESSURE: 145 MMHG | RESPIRATION RATE: 18 BRPM | HEIGHT: 72 IN | DIASTOLIC BLOOD PRESSURE: 100 MMHG | WEIGHT: 210 LBS

## 2019-05-26 DIAGNOSIS — R10.84 ABDOMINAL PAIN, GENERALIZED: ICD-10-CM

## 2019-05-26 DIAGNOSIS — R11.0 NAUSEA WITHOUT VOMITING: ICD-10-CM

## 2019-05-26 DIAGNOSIS — R39.9 URINARY TRACT INFECTION SYMPTOMS: Primary | ICD-10-CM

## 2019-05-26 DIAGNOSIS — Z71.6 ENCOUNTER FOR SMOKING CESSATION COUNSELING: ICD-10-CM

## 2019-05-26 DIAGNOSIS — Z72.0 TOBACCO ABUSE: ICD-10-CM

## 2019-05-26 DIAGNOSIS — Z91.14 HX OF MEDICATION NONCOMPLIANCE: ICD-10-CM

## 2019-05-26 DIAGNOSIS — R03.0 ELEVATED BLOOD PRESSURE READING: ICD-10-CM

## 2019-05-26 LAB
APPEARANCE UR: ABNORMAL
BACTERIA URNS QL MICRO: NEGATIVE /HPF
BILIRUB UR QL: NEGATIVE
CLUE CELLS VAG QL WET PREP: NORMAL
COLOR UR: ABNORMAL
EPITH CASTS URNS QL MICRO: ABNORMAL /LPF
GLUCOSE UR STRIP.AUTO-MCNC: NEGATIVE MG/DL
HCG UR QL: NEGATIVE
HGB UR QL STRIP: ABNORMAL
KETONES UR QL STRIP.AUTO: NEGATIVE MG/DL
KOH PREP SPEC: NORMAL
LEUKOCYTE ESTERASE UR QL STRIP.AUTO: ABNORMAL
NITRITE UR QL STRIP.AUTO: NEGATIVE
PH UR STRIP: 5.5 [PH] (ref 5–8)
PROT UR STRIP-MCNC: >300 MG/DL
RBC #/AREA URNS HPF: ABNORMAL /HPF (ref 0–5)
SERVICE CMNT-IMP: NORMAL
SP GR UR REFRACTOMETRY: 1.02 (ref 1–1.03)
T VAGINALIS VAG QL WET PREP: NORMAL
UA: UC IF INDICATED,UAUC: ABNORMAL
UROBILINOGEN UR QL STRIP.AUTO: 0.2 EU/DL (ref 0.2–1)
WBC URNS QL MICRO: ABNORMAL /HPF (ref 0–4)

## 2019-05-26 PROCEDURE — 87210 SMEAR WET MOUNT SALINE/INK: CPT

## 2019-05-26 PROCEDURE — 81001 URINALYSIS AUTO W/SCOPE: CPT

## 2019-05-26 PROCEDURE — 99284 EMERGENCY DEPT VISIT MOD MDM: CPT

## 2019-05-26 PROCEDURE — 87491 CHLMYD TRACH DNA AMP PROBE: CPT

## 2019-05-26 PROCEDURE — 81025 URINE PREGNANCY TEST: CPT

## 2019-05-26 PROCEDURE — 87086 URINE CULTURE/COLONY COUNT: CPT

## 2019-05-26 PROCEDURE — 87077 CULTURE AEROBIC IDENTIFY: CPT

## 2019-05-26 PROCEDURE — 87186 SC STD MICRODIL/AGAR DIL: CPT

## 2019-05-26 PROCEDURE — 74011250637 HC RX REV CODE- 250/637: Performed by: EMERGENCY MEDICINE

## 2019-05-26 RX ORDER — CEPHALEXIN 250 MG/1
500 CAPSULE ORAL
Status: COMPLETED | OUTPATIENT
Start: 2019-05-26 | End: 2019-05-26

## 2019-05-26 RX ORDER — NAPROXEN SODIUM 220 MG
220 TABLET ORAL 2 TIMES DAILY WITH MEALS
Qty: 20 TAB | Refills: 0 | Status: SHIPPED | OUTPATIENT
Start: 2019-05-26 | End: 2021-07-22

## 2019-05-26 RX ORDER — ONDANSETRON 4 MG/1
4 TABLET, ORALLY DISINTEGRATING ORAL
Status: COMPLETED | OUTPATIENT
Start: 2019-05-26 | End: 2019-05-26

## 2019-05-26 RX ORDER — DICYCLOMINE HYDROCHLORIDE 10 MG/1
10 CAPSULE ORAL
Status: COMPLETED | OUTPATIENT
Start: 2019-05-26 | End: 2019-05-26

## 2019-05-26 RX ORDER — METRONIDAZOLE 500 MG/1
500 TABLET ORAL 2 TIMES DAILY
Qty: 14 TAB | Refills: 0 | Status: SHIPPED | OUTPATIENT
Start: 2019-05-26 | End: 2019-06-02

## 2019-05-26 RX ORDER — AMLODIPINE BESYLATE 10 MG/1
10 TABLET ORAL DAILY
Qty: 20 TAB | Refills: 0 | Status: SHIPPED | OUTPATIENT
Start: 2019-05-26 | End: 2019-06-15

## 2019-05-26 RX ORDER — ONDANSETRON 4 MG/1
4 TABLET, ORALLY DISINTEGRATING ORAL
Qty: 20 TAB | Refills: 0 | Status: SHIPPED | OUTPATIENT
Start: 2019-05-26 | End: 2022-01-14

## 2019-05-26 RX ORDER — METRONIDAZOLE 500 MG/1
500 TABLET ORAL
Status: COMPLETED | OUTPATIENT
Start: 2019-05-26 | End: 2019-05-26

## 2019-05-26 RX ORDER — CEPHALEXIN 500 MG/1
500 CAPSULE ORAL 3 TIMES DAILY
Qty: 21 CAP | Refills: 0 | Status: SHIPPED | OUTPATIENT
Start: 2019-05-26 | End: 2019-06-02

## 2019-05-26 RX ADMIN — ONDANSETRON 4 MG: 4 TABLET, ORALLY DISINTEGRATING ORAL at 23:00

## 2019-05-26 RX ADMIN — CEPHALEXIN 500 MG: 250 CAPSULE ORAL at 23:49

## 2019-05-26 RX ADMIN — DICYCLOMINE HYDROCHLORIDE 10 MG: 10 CAPSULE ORAL at 23:01

## 2019-05-26 RX ADMIN — METRONIDAZOLE 500 MG: 500 TABLET ORAL at 23:49

## 2019-05-27 NOTE — ED NOTES
Discharge instructions were given to the patient by Sundar Jarrett.     The patient left the Emergency Department ambulatory, alert and oriented and in no acute distress with 5 prescriptions. The patient was encouraged to call or return to the ED for worsening issues or problems and was encouraged to schedule a follow up appointment for continuing care. The patient verbalized understanding of discharge instructions and prescriptions, all questions were answered. The patient has no further concerns at this time.

## 2019-05-27 NOTE — ED NOTES
Pt presents ambulatory to ED complaining of abdominal pain x1 month, with bleeding when she wipes. Pt denies discharge. Pt reports pain is similar to what she felt when she miscarried 3-4 months ago. Pt reports nausea, denies vomiting. Pt reports hx of HTN, but has not taken medication in months (does not know name of BP med). Pt is alert and oriented x 4, RR even and unlabored, skin is warm and dry. Assesment completed and pt updated on plan of care. Emergency Department Nursing Plan of Care       The Nursing Plan of Care is developed from the Nursing assessment and Emergency Department Attending provider initial evaluation. The plan of care may be reviewed in the ED Provider note.     The Plan of Care was developed with the following considerations:   Patient / Family readiness to learn indicated by:verbalized understanding  Persons(s) to be included in education: patient  Barriers to Learning/Limitations:Mariah Youngblood 10.    5/26/2019   10:58 PM

## 2019-05-27 NOTE — DISCHARGE INSTRUCTIONS
Thank you for allowing us to take care of you today! We hope we addressed all of your concerns and needs. We strive to provide excellent quality care in the Emergency Department. You will receive a survey after your visit to evaluate the care you were provided. Should you receive a survey from us, we invite you to share your experience and tell us what made it excellent. It was a pleasure serving you, we invite you to share your experience with us, in our pursuit for excellence, should you be selected to receive a survey. The exam and treatment you received in the Emergency Department were for an urgent problem and are not intended as complete care. It is important that you follow up with a doctor, nurse practitioner, or physician assistant for ongoing care. If your symptoms become worse or you do not improve as expected and you are unable to reach your usual health care provider, you should return to the Emergency Department. We are available 24 hours a day. Please take your discharge instructions with you when you go to your follow-up appointment. If you have any problem arranging a follow-up appointment, contact the Emergency Department immediately. If a prescription has been provided, please have it filled as soon as possible to prevent a delay in treatment. Read the entire medication instruction sheet provided to you by the pharmacy. If you have any questions or reservations about taking the medication due to side effects or interactions with other medications, please call your primary care physician or contact the ER to speak with the charge nurse. Make an appointment with your family doctor or the physician you were referred to for follow-up of this visit as instructed on your discharge paperwork, as this is mandatory follow-up. Return to the ER if you are unable to be seen or if you are unable to be seen in a timely manner.     If you have any problem arranging the follow-up visit, contact the Emergency Department immediately. I hope you feel better and thank you again for allow us to provide you with excellent care today at [facility]! Warmest regards,    Daryle Lamer, MD  Emergency Medicine Physician  [facility]              Patient Education        Abdominal Pain: Care Instructions  Your Care Instructions    Abdominal pain has many possible causes. Some aren't serious and get better on their own in a few days. Others need more testing and treatment. If your pain continues or gets worse, you need to be rechecked and may need more tests to find out what is wrong. You may need surgery to correct the problem. Don't ignore new symptoms, such as fever, nausea and vomiting, urination problems, pain that gets worse, and dizziness. These may be signs of a more serious problem. Your doctor may have recommended a follow-up visit in the next 8 to 12 hours. If you are not getting better, you may need more tests or treatment. The doctor has checked you carefully, but problems can develop later. If you notice any problems or new symptoms, get medical treatment right away. Follow-up care is a key part of your treatment and safety. Be sure to make and go to all appointments, and call your doctor if you are having problems. It's also a good idea to know your test results and keep a list of the medicines you take. How can you care for yourself at home? · Rest until you feel better. · To prevent dehydration, drink plenty of fluids, enough so that your urine is light yellow or clear like water. Choose water and other caffeine-free clear liquids until you feel better. If you have kidney, heart, or liver disease and have to limit fluids, talk with your doctor before you increase the amount of fluids you drink. · If your stomach is upset, eat mild foods, such as rice, dry toast or crackers, bananas, and applesauce.  Try eating several small meals instead of two or three large ones. · Wait until 48 hours after all symptoms have gone away before you have spicy foods, alcohol, and drinks that contain caffeine. · Do not eat foods that are high in fat. · Avoid anti-inflammatory medicines such as aspirin, ibuprofen (Advil, Motrin), and naproxen (Aleve). These can cause stomach upset. Talk to your doctor if you take daily aspirin for another health problem. When should you call for help? Call 911 anytime you think you may need emergency care. For example, call if:    · You passed out (lost consciousness).     · You pass maroon or very bloody stools.     · You vomit blood or what looks like coffee grounds.     · You have new, severe belly pain.    Call your doctor now or seek immediate medical care if:    · Your pain gets worse, especially if it becomes focused in one area of your belly.     · You have a new or higher fever.     · Your stools are black and look like tar, or they have streaks of blood.     · You have unexpected vaginal bleeding.     · You have symptoms of a urinary tract infection. These may include:  ? Pain when you urinate. ? Urinating more often than usual.  ? Blood in your urine.     · You are dizzy or lightheaded, or you feel like you may faint.    Watch closely for changes in your health, and be sure to contact your doctor if:    · You are not getting better after 1 day (24 hours). Where can you learn more? Go to http://mady-grady.info/. Enter D204 in the search box to learn more about \"Abdominal Pain: Care Instructions. \"  Current as of: September 23, 2018  Content Version: 11.9  © 4129-7918 [employer]. Care instructions adapted under license by [employer] (which disclaims liability or warranty for this information).  If you have questions about a medical condition or this instruction, always ask your healthcare professional. Mark Juan any warranty or liability for your use of this information. Patient Education        Nausea and Vomiting: Care Instructions  Your Care Instructions    When you are nauseated, you may feel weak and sweaty and notice a lot of saliva in your mouth. Nausea often leads to vomiting. Most of the time you do not need to worry about nausea and vomiting, but they can be signs of other illnesses. Two common causes of nausea and vomiting are stomach flu and food poisoning. Nausea and vomiting from viral stomach flu will usually start to improve within 24 hours. Nausea and vomiting from food poisoning may last from 12 to 48 hours. The doctor has checked you carefully, but problems can develop later. If you notice any problems or new symptoms, get medical treatment right away. Follow-up care is a key part of your treatment and safety. Be sure to make and go to all appointments, and call your doctor if you are having problems. It's also a good idea to know your test results and keep a list of the medicines you take. How can you care for yourself at home? · To prevent dehydration, drink plenty of fluids, enough so that your urine is light yellow or clear like water. Choose water and other caffeine-free clear liquids until you feel better. If you have kidney, heart, or liver disease and have to limit fluids, talk with your doctor before you increase the amount of fluids you drink. · Rest in bed until you feel better. · When you are able to eat, try clear soups, mild foods, and liquids until all symptoms are gone for 12 to 48 hours. Other good choices include dry toast, crackers, cooked cereal, and gelatin dessert, such as Jell-O. When should you call for help? Call 911 anytime you think you may need emergency care.  For example, call if:    · You passed out (lost consciousness).    Call your doctor now or seek immediate medical care if:    · You have symptoms of dehydration, such as:  ? Dry eyes and a dry mouth.  ? Passing only a little dark urine. ? Feeling thirstier than usual.     · You have new or worsening belly pain.     · You have a new or higher fever.     · You vomit blood or what looks like coffee grounds.    Watch closely for changes in your health, and be sure to contact your doctor if:    · You have ongoing nausea and vomiting.     · Your vomiting is getting worse.     · Your vomiting lasts longer than 2 days.     · You are not getting better as expected. Where can you learn more? Go to http://mady-grady.info/. Enter 25 [id number] in the search box to learn more about \"Nausea and Vomiting: Care Instructions. \"  Current as of: September 23, 2018  Content Version: 11.9  © 8255-7959 [employer]. Care instructions adapted under license by [employer] (which disclaims liability or warranty for this information). If you have questions about a medical condition or this instruction, always ask your healthcare professional. [place] [address] any warranty or liability for your use of this information.

## 2019-05-27 NOTE — ED TRIAGE NOTES
C/o lower abdominal pain x 3 days. Pt states \"it feels like I'm having a miscarriage. \" Pt reports she is spotting when she wipes but is not passing clots. Pt reports she bled throughout her previous pregnancies. Pt denies taking a pregnancy test recently and is unsure if she is pregnant. Pt also reports burning on urination. Pt reports nausea with no vomiting.

## 2019-05-27 NOTE — ED PROVIDER NOTES
EMERGENCY DEPARTMENT HISTORY AND PHYSICAL EXAM      Date: 5/26/2019  Patient Name: Bill Peña  Patient Age and Sex: 30 y.o. female    History of Presenting Illness     Chief Complaint   Patient presents with    Abdominal Pain       History Provided By: Patient    HPI: Bill Peña, 30 y.o. female with PMHx significant for HTN presents to the ED with c/o of 3-4 months of lower abdominal pain that was exacerbated in the past day. Pt states she has noticed some dysuria and urinary frequency and is concerned about a UTI. She also states she is currently on her period but is concerned she may be having a miscarriage as she is sexually active still. She reports recent spotting but no excessive vaginal bleeding, no clots. She reports nausea but no emesis. She reports taking no medications for her symptoms. She currently denies any vaginal discharge, pelvic pain but states she wants to be checked for STD's. Additionally, she reports a h/o HTN but has not taken her medications in several months. Pt denies any other alleviating or exacerbating factors. Additionally, pt specifically denies any recent fever, chills, headache, vomiting, CP, SOB, lightheadedness, dizziness, numbness, weakness, BLE swelling, heart palpitations, diarrhea, constipation, melena, hematochezia, cough, or congestion. PCP: Augustin Collins MD    There are no other complaints, changes or physical findings at this time.      Past History   Past Medical History:  Past Medical History:   Diagnosis Date    Hypertension     Other ill-defined conditions(799.89)     muscle spasms    Other ill-defined conditions(799.89)     a fib    Other ill-defined conditions(799.89)     angina       Past Surgical History:  Past Surgical History:   Procedure Laterality Date    HX ORTHOPAEDIC      left knee       Family History:  Family History   Problem Relation Age of Onset    Diabetes Mother     Hypertension Mother     Diabetes Maternal Grandfather  Hypertension Maternal Grandfather        Social History:  Social History     Tobacco Use    Smoking status: Current Every Day Smoker     Packs/day: 1.50     Years: 0.50     Pack years: 0.75    Smokeless tobacco: Never Used   Substance Use Topics    Alcohol use: No     Comment: occassionally    Drug use: Yes     Types: Marijuana       Allergies: Allergies   Allergen Reactions    Other Food Anaphylaxis     ONION    Onion Anaphylaxis       Current Medications:  No current facility-administered medications on file prior to encounter. Current Outpatient Medications on File Prior to Encounter   Medication Sig Dispense Refill    sertraline (ZOLOFT) 25 mg tablet Take 1 Tab by mouth daily. Indications: ANXIETY WITH DEPRESSION 30 Tab 0    prenatal vit-iron fumarate-fa (PRENATAL PLUS WITH IRON) 28 mg iron- 800 mcg tab Take 1 Tab by mouth daily. Indications: pregnancy 30 Tab 0       Review of Systems   Review of Systems   Constitutional: Negative. Negative for chills and fever. HENT: Negative. Negative for congestion, facial swelling, rhinorrhea, sore throat, trouble swallowing and voice change. Eyes: Negative. Respiratory: Negative. Negative for apnea, cough, chest tightness, shortness of breath and wheezing. Cardiovascular: Negative. Negative for chest pain, palpitations and leg swelling. Gastrointestinal: Positive for abdominal pain and nausea. Negative for abdominal distention, blood in stool, constipation, diarrhea and vomiting. Endocrine: Negative. Negative for cold intolerance, heat intolerance and polyuria. Genitourinary: Positive for dysuria, frequency and urgency. Negative for difficulty urinating, flank pain and hematuria. Musculoskeletal: Negative. Negative for arthralgias, back pain, myalgias, neck pain and neck stiffness. Skin: Negative. Negative for color change and rash. Neurological: Negative.   Negative for dizziness, syncope, facial asymmetry, speech difficulty, weakness, light-headedness, numbness and headaches. Hematological: Negative. Does not bruise/bleed easily. Psychiatric/Behavioral: Negative. Negative for confusion and self-injury. The patient is not nervous/anxious. Physical Exam   Physical Exam   Constitutional: She is oriented to person, place, and time. She appears well-developed and well-nourished. No distress. HENT:   Head: Normocephalic and atraumatic. Mouth/Throat: Oropharynx is clear and moist. No oropharyngeal exudate. Eyes: Pupils are equal, round, and reactive to light. Conjunctivae and EOM are normal.   Neck: Normal range of motion. Cardiovascular: Normal rate, regular rhythm and normal heart sounds. Exam reveals no gallop and no friction rub. No murmur heard. Pulmonary/Chest: Effort normal and breath sounds normal. No respiratory distress. She has no wheezes. She has no rales. She exhibits no tenderness. Abdominal: Soft. Bowel sounds are normal. She exhibits no distension and no mass. There is no tenderness. There is no rebound and no guarding. Musculoskeletal: Normal range of motion. She exhibits no edema, tenderness or deformity. Neurological: She is alert and oriented to person, place, and time. She displays normal reflexes. No cranial nerve deficit. She exhibits normal muscle tone. Coordination normal.   Skin: Skin is warm. No rash noted. She is not diaphoretic. Psychiatric: She has a normal mood and affect. Nursing note and vitals reviewed.       Diagnostic Study Results     Labs -  Recent Results (from the past 24 hour(s))   HCG URINE, QL. - POC    Collection Time: 05/26/19 11:01 PM   Result Value Ref Range    Pregnancy test,urine (POC) NEGATIVE  NEG     URINALYSIS W/ REFLEX CULTURE    Collection Time: 05/26/19 11:10 PM   Result Value Ref Range    Color YELLOW/STRAW      Appearance TURBID (A) CLEAR      Specific gravity 1.025 1.003 - 1.030      pH (UA) 5.5 5.0 - 8.0      Protein >300 (A) NEG mg/dL    Glucose NEGATIVE  NEG mg/dL    Ketone NEGATIVE  NEG mg/dL    Bilirubin NEGATIVE  NEG      Blood LARGE (A) NEG      Urobilinogen 0.2 0.2 - 1.0 EU/dL    Nitrites NEGATIVE  NEG      Leukocyte Esterase LARGE (A) NEG      WBC 10-20 0 - 4 /hpf    RBC 5-10 0 - 5 /hpf    Epithelial cells FEW FEW /lpf    Bacteria NEGATIVE  NEG /hpf    UA:UC IF INDICATED URINE CULTURE ORDERED (A) CNI     WET PREP    Collection Time: 05/26/19 11:10 PM   Result Value Ref Range    Clue cells CLUE CELLS PRESENT      Wet prep NO TRICHOMONAS SEEN     KOH, OTHER SOURCES    Collection Time: 05/26/19 11:10 PM   Result Value Ref Range    Special Requests: NO SPECIAL REQUESTS      KOH NO YEAST SEEN         Radiologic Studies -   No orders to display     CT Results  (Last 48 hours)    None        CXR Results  (Last 48 hours)    None          Medical Decision Making   I am the first provider for this patient. I reviewed the vital signs, available nursing notes, past medical history, past surgical history, family history and social history. Vital Signs-Reviewed the patient's vital signs. Patient Vitals for the past 24 hrs:   Temp Pulse Resp BP SpO2   05/26/19 2228 98.1 °F (36.7 °C) 79 18 (!) 145/100 99 %       Pulse Oximetry Analysis - 99% on RA    Cardiac Monitor:   Rate: 79 bpm  Rhythm: Normal Sinus Rhythm      Records Reviewed: Nursing Notes, Old Medical Records, Previous electrocardiograms, Previous Radiology Studies and Previous Laboratory Studies    Provider Notes (Medical Decision Making):   Patient was presents with dysuria and urinary frequency, additionally she wants to be checked for STD's. DDx: Acute cystitis, pyleonephritis, ureteral stone, STI. Will obtain UA and treat accordingly. Discussed with the patient diagnosis and test results and all questioned fully answered. They understand the importance of staying well hydrated, taking antibiotics as prescribed to completion and using OTC pyridium as desired.   She will PCP if any problems arise.     ED Course:   Initial assessment performed. The patients presenting problems have been discussed, and they are in agreement with the care plan formulated and outlined with them. I have encouraged them to ask questions as they arise throughout their visit. TOBACCO COUNSELING:   Upon evaluation, pt expressed that they are a current tobacco user. For approximately 10 minutes, pt has been counseled on the dangers of smoking and was encouraged to quit as soon as possible in order to decrease further risks to their health. Pt has conveyed their understanding of the risks involved should they continue to use tobacco products. ALCOHOL/SUBSTANCE ABUSE COUNSELING:  Upon evaluation, pt endorsed recent alcohol/illicit drug use. For approximately 15 minutes, pt has been counseled on the dangers of alcohol and illicit drug use on their health, and they were encouraged to quit as soon as possible in order to decrease further risks to their health. Pt has conveyed their understanding of the risks involved should they continue to use these products. HYPERTENSION COUNSELING   Education was provided to the patient today regarding their hypertension. Patient is made aware of their elevated blood pressure and is instructed to follow up this week with their Primary Care for a recheck. Patient is counseled regarding consequences of chronic, uncontrolled hypertension including kidney disease, heart disease, stroke or even death. Patient states their understanding and agrees to follow up this week. Additionally, during their visit, I discussed sodium restriction, maintaining ideal body weight and regular exercise program as physiologic means to achieve blood pressure control. The patient will strive towards this. I reviewed our electronic medical record system for any past medical records that were available that may contribute to the patient's current condition, the nursing notes and vital signs from today's visit. Sally Groves MD    Medications Administered During ED Course:  Medications   dicyclomine (BENTYL) capsule 10 mg (10 mg Oral Given 5/26/19 2301)   ondansetron (ZOFRAN ODT) tablet 4 mg (4 mg Oral Given 5/26/19 2300)   cephALEXin (KEFLEX) capsule 500 mg (500 mg Oral Given 5/26/19 2349)   metroNIDAZOLE (FLAGYL) tablet 500 mg (500 mg Oral Given 5/26/19 2349)     Progress Note:  Patient has been reassessed and reports feeling better and symptoms have improved after ED treatment. Moshe Justin is able to tolerate PO and ambulate per baseline. Bryan Oquendo's final labs and imaging have been reviewed with her. She has been counseled regarding her diagnosis. She verbally conveys understanding and agreement of the signs, symptoms, diagnosis, treatment and prognosis and additionally agrees to follow up as recommended with Dr. Avel Estrella MD in 24 - 48 hours. She also agrees with the care-plan and conveys that all of her questions have been answered. I have also put together some discharge instructions for her that include: 1) educational information regarding their diagnosis, 2) how to care for their diagnosis at home, as well a 3) list of reasons why they would want to return to the ED prior to their follow-up appointment, should their condition change. I have answered all questions to the patient's satisfaction. Strict return precautions given. She both understood and agreed with plan as discussed above. Vital signs stable for discharge. Disposition: DISCHARGE     The pt is ready for discharge. The pt's signs, symptoms, diagnosis, and discharge instructions have been discussed and pt has conveyed their understanding. The pt is to follow up as recommended or return to ER should their symptoms worsen. Plan has been discussed and pt is in agreement. PLAN:  1.    Discharge Medication List as of 5/26/2019 11:54 PM      START taking these medications    Details   cephALEXin (KEFLEX) 500 mg capsule Take 1 Cap by mouth three (3) times daily for 7 days. , Print, Disp-21 Cap, R-0      metroNIDAZOLE (FLAGYL) 500 mg tablet Take 1 Tab by mouth two (2) times a day for 7 days. , Print, Disp-14 Tab, R-0      ondansetron (ZOFRAN ODT) 4 mg disintegrating tablet Take 1 Tab by mouth every eight (8) hours as needed for Nausea. , Print, Disp-20 Tab, R-0      amLODIPine (NORVASC) 10 mg tablet Take 1 Tab by mouth daily for 20 days. , Print, Disp-20 Tab, R-0      naproxen sodium (MIDOL, NAPROXEN,) 220 mg tablet Take 1 Tab by mouth two (2) times daily (with meals). , Print, Disp-20 Tab, R-0         CONTINUE these medications which have NOT CHANGED    Details   sertraline (ZOLOFT) 25 mg tablet Take 1 Tab by mouth daily. Indications: ANXIETY WITH DEPRESSION, Print, Disp-30 Tab, R-0      prenatal vit-iron fumarate-fa (PRENATAL PLUS WITH IRON) 28 mg iron- 800 mcg tab Take 1 Tab by mouth daily. Indications: pregnancy, Print, Disp-30 Tab, R-0           2. Follow-up Information     Follow up With Specialties Details Why Contact Info    Ana Aaron MD General Practice   [address] [address] [phone]      [facility] EMERGENCY DEPT Emergency Medicine  As needed, If symptoms worsen [address] [place]  [phone]          Return to ED if worse  Diagnosis     Clinical Impression:   1. Urinary tract infection symptoms    2. Nausea without vomiting    3. Elevated blood pressure reading    4. Abdominal pain, generalized    5. Tobacco abuse    6. Encounter for smoking cessation counseling    7. Hx of medication noncompliance        Attestation:    I personally performed the services described in this documentation on this date 5/26/2019 for patient Gregory Koroma. I am the sole author of this note. Juan Thakkar MD    Please note that this dictation was completed with [employer], the [employer] voice recognition software.   Quite often unanticipated grammatical, syntax, homophones, and other interpretive errors are inadvertently transcribed by the computer software. Please disregard these errors. Please excuse any errors that have escaped final proofreading. Thank you. This note will not be viewable in [address]

## 2019-05-29 LAB
BACTERIA SPEC CULT: ABNORMAL
CC UR VC: ABNORMAL
SERVICE CMNT-IMP: ABNORMAL

## 2019-05-30 LAB
C TRACH DNA SPEC QL NAA+PROBE: NEGATIVE
N GONORRHOEA DNA SPEC QL NAA+PROBE: NEGATIVE
SAMPLE TYPE: NORMAL
SERVICE CMNT-IMP: NORMAL
SPECIMEN SOURCE: NORMAL

## 2021-04-08 ENCOUNTER — APPOINTMENT (OUTPATIENT)
Dept: CT IMAGING | Age: 31
End: 2021-04-08
Attending: EMERGENCY MEDICINE
Payer: MEDICAID

## 2021-04-08 ENCOUNTER — APPOINTMENT (OUTPATIENT)
Dept: GENERAL RADIOLOGY | Age: 31
End: 2021-04-08
Attending: EMERGENCY MEDICINE
Payer: MEDICAID

## 2021-04-08 ENCOUNTER — HOSPITAL ENCOUNTER (EMERGENCY)
Age: 31
Discharge: HOME | End: 2021-04-08
Attending: EMERGENCY MEDICINE
Payer: MEDICAID

## 2021-04-08 VITALS
HEIGHT: 72 IN | SYSTOLIC BLOOD PRESSURE: 143 MMHG | WEIGHT: 219.4 LBS | BODY MASS INDEX: 29.72 KG/M2 | OXYGEN SATURATION: 100 % | HEART RATE: 60 BPM | DIASTOLIC BLOOD PRESSURE: 84 MMHG | RESPIRATION RATE: 16 BRPM | TEMPERATURE: 98.5 F

## 2021-04-08 DIAGNOSIS — Z53.29 LEFT AGAINST MEDICAL ADVICE: ICD-10-CM

## 2021-04-08 DIAGNOSIS — R03.0 ELEVATED BLOOD PRESSURE READING: ICD-10-CM

## 2021-04-08 DIAGNOSIS — H53.9 VISUAL DISTURBANCE: ICD-10-CM

## 2021-04-08 DIAGNOSIS — R51.9 NONINTRACTABLE HEADACHE, UNSPECIFIED CHRONICITY PATTERN, UNSPECIFIED HEADACHE TYPE: Primary | ICD-10-CM

## 2021-04-08 LAB
ALBUMIN SERPL-MCNC: 3.9 G/DL (ref 3.5–5)
ALBUMIN/GLOB SERPL: 1 {RATIO} (ref 1.1–2.2)
ALP SERPL-CCNC: 149 U/L (ref 45–117)
ALT SERPL-CCNC: 15 U/L (ref 12–78)
AMPHET UR QL SCN: NEGATIVE
ANION GAP SERPL CALC-SCNC: 12 MMOL/L (ref 5–15)
APPEARANCE UR: ABNORMAL
AST SERPL-CCNC: 15 U/L (ref 15–37)
BACTERIA URNS QL MICRO: NEGATIVE /HPF
BARBITURATES UR QL SCN: NEGATIVE
BASOPHILS # BLD: 0.1 K/UL (ref 0–0.1)
BASOPHILS NFR BLD: 1 % (ref 0–1)
BENZODIAZ UR QL: NEGATIVE
BILIRUB SERPL-MCNC: 0.3 MG/DL (ref 0.2–1)
BILIRUB UR QL: NEGATIVE
BUN SERPL-MCNC: 4 MG/DL (ref 6–20)
BUN/CREAT SERPL: 5 (ref 12–20)
CALCIUM SERPL-MCNC: 9 MG/DL (ref 8.5–10.1)
CANNABINOIDS UR QL SCN: POSITIVE
CHLORIDE SERPL-SCNC: 103 MMOL/L (ref 97–108)
CO2 SERPL-SCNC: 25 MMOL/L (ref 21–32)
COCAINE UR QL SCN: NEGATIVE
COLOR UR: ABNORMAL
CREAT SERPL-MCNC: 0.86 MG/DL (ref 0.55–1.02)
DIFFERENTIAL METHOD BLD: ABNORMAL
DRUG SCRN COMMENT,DRGCM: ABNORMAL
EOSINOPHIL # BLD: 0.1 K/UL (ref 0–0.4)
EOSINOPHIL NFR BLD: 1 % (ref 0–7)
EPITH CASTS URNS QL MICRO: ABNORMAL /LPF
ERYTHROCYTE [DISTWIDTH] IN BLOOD BY AUTOMATED COUNT: 14.5 % (ref 11.5–14.5)
ETHANOL SERPL-MCNC: <10 MG/DL
GLOBULIN SER CALC-MCNC: 4 G/DL (ref 2–4)
GLUCOSE SERPL-MCNC: 113 MG/DL (ref 65–100)
GLUCOSE UR STRIP.AUTO-MCNC: NEGATIVE MG/DL
HCG SERPL QL: NEGATIVE
HCT VFR BLD AUTO: 37.4 % (ref 35–47)
HGB BLD-MCNC: 12.6 G/DL (ref 11.5–16)
HGB UR QL STRIP: NEGATIVE
IMM GRANULOCYTES # BLD AUTO: 0 K/UL (ref 0–0.04)
IMM GRANULOCYTES NFR BLD AUTO: 0 % (ref 0–0.5)
KETONES UR QL STRIP.AUTO: ABNORMAL MG/DL
LEUKOCYTE ESTERASE UR QL STRIP.AUTO: NEGATIVE
LYMPHOCYTES # BLD: 0.7 K/UL (ref 0.8–3.5)
LYMPHOCYTES NFR BLD: 13 % (ref 12–49)
MCH RBC QN AUTO: 30.7 PG (ref 26–34)
MCHC RBC AUTO-ENTMCNC: 33.7 G/DL (ref 30–36.5)
MCV RBC AUTO: 91 FL (ref 80–99)
METHADONE UR QL: NEGATIVE
MONOCYTES # BLD: 0.5 K/UL (ref 0–1)
MONOCYTES NFR BLD: 10 % (ref 5–13)
NEUTS SEG # BLD: 3.6 K/UL (ref 1.8–8)
NEUTS SEG NFR BLD: 75 % (ref 32–75)
NITRITE UR QL STRIP.AUTO: NEGATIVE
NRBC # BLD: 0 K/UL (ref 0–0.01)
NRBC BLD-RTO: 0 PER 100 WBC
OPIATES UR QL: NEGATIVE
PCP UR QL: NEGATIVE
PH UR STRIP: 8 [PH] (ref 5–8)
PLATELET # BLD AUTO: 285 K/UL (ref 150–400)
PMV BLD AUTO: 9.6 FL (ref 8.9–12.9)
POTASSIUM SERPL-SCNC: 3.4 MMOL/L (ref 3.5–5.1)
PROT SERPL-MCNC: 7.9 G/DL (ref 6.4–8.2)
PROT UR STRIP-MCNC: NEGATIVE MG/DL
RBC # BLD AUTO: 4.11 M/UL (ref 3.8–5.2)
RBC #/AREA URNS HPF: ABNORMAL /HPF (ref 0–5)
RBC MORPH BLD: ABNORMAL
SODIUM SERPL-SCNC: 140 MMOL/L (ref 136–145)
SP GR UR REFRACTOMETRY: 1.02 (ref 1–1.03)
UA: UC IF INDICATED,UAUC: ABNORMAL
UROBILINOGEN UR QL STRIP.AUTO: 1 EU/DL (ref 0.2–1)
WBC # BLD AUTO: 5 K/UL (ref 3.6–11)
WBC URNS QL MICRO: ABNORMAL /HPF (ref 0–4)

## 2021-04-08 PROCEDURE — 85025 COMPLETE CBC W/AUTO DIFF WBC: CPT

## 2021-04-08 PROCEDURE — 80307 DRUG TEST PRSMV CHEM ANLYZR: CPT

## 2021-04-08 PROCEDURE — 36415 COLL VENOUS BLD VENIPUNCTURE: CPT

## 2021-04-08 PROCEDURE — 84703 CHORIONIC GONADOTROPIN ASSAY: CPT

## 2021-04-08 PROCEDURE — 74011250636 HC RX REV CODE- 250/636: Performed by: EMERGENCY MEDICINE

## 2021-04-08 PROCEDURE — 71045 X-RAY EXAM CHEST 1 VIEW: CPT

## 2021-04-08 PROCEDURE — 74011000250 HC RX REV CODE- 250: Performed by: EMERGENCY MEDICINE

## 2021-04-08 PROCEDURE — 81001 URINALYSIS AUTO W/SCOPE: CPT

## 2021-04-08 PROCEDURE — 70450 CT HEAD/BRAIN W/O DYE: CPT

## 2021-04-08 PROCEDURE — 82077 ASSAY SPEC XCP UR&BREATH IA: CPT

## 2021-04-08 PROCEDURE — 96374 THER/PROPH/DIAG INJ IV PUSH: CPT

## 2021-04-08 PROCEDURE — 74011250637 HC RX REV CODE- 250/637: Performed by: EMERGENCY MEDICINE

## 2021-04-08 PROCEDURE — 80053 COMPREHEN METABOLIC PANEL: CPT

## 2021-04-08 PROCEDURE — 99285 EMERGENCY DEPT VISIT HI MDM: CPT

## 2021-04-08 RX ORDER — HYDRALAZINE HYDROCHLORIDE 20 MG/ML
10 INJECTION INTRAMUSCULAR; INTRAVENOUS
Status: DISCONTINUED | OUTPATIENT
Start: 2021-04-08 | End: 2021-04-08

## 2021-04-08 RX ORDER — LORAZEPAM 2 MG/ML
1 INJECTION INTRAMUSCULAR
Status: DISCONTINUED | OUTPATIENT
Start: 2021-04-08 | End: 2021-04-08 | Stop reason: HOSPADM

## 2021-04-08 RX ORDER — SODIUM CHLORIDE 0.9 % (FLUSH) 0.9 %
10 SYRINGE (ML) INJECTION
Status: DISCONTINUED | OUTPATIENT
Start: 2021-04-08 | End: 2021-04-08 | Stop reason: HOSPADM

## 2021-04-08 RX ORDER — ACETAMINOPHEN 500 MG
1000 TABLET ORAL
Status: COMPLETED | OUTPATIENT
Start: 2021-04-08 | End: 2021-04-08

## 2021-04-08 RX ADMIN — ACETAMINOPHEN 1000 MG: 500 TABLET, FILM COATED ORAL at 03:14

## 2021-04-08 RX ADMIN — PROCHLORPERAZINE EDISYLATE 10 MG: 5 INJECTION INTRAMUSCULAR; INTRAVENOUS at 04:32

## 2021-04-08 NOTE — ED NOTES
Pt arrived w/ EMS. EMS reports that the pt has flu-like symptoms w/ an elevated temp, and that pt sister reported pt was sleeping all day. Pt has been consistently crying since arrival and holding her left eye. Pt states the \"pressure in her eye\" is the reason for her visit tonight. Pt reports she cannot see out of her L eye, but can see out of her R eye. Pt not answering A&O questions at this time. Skin warm, dry, intact. Call bell within reach. Will continue to monitor      Emergency Department Nursing Plan of Care       The Nursing Plan of Care is developed from the Nursing assessment and Emergency Department Attending provider initial evaluation. The plan of care may be reviewed in the ED Provider note.     The Plan of Care was developed with the following considerations:   Patient / Family readiness to learn indicated by:verbalized understanding  Persons(s) to be included in education: patient  Barriers to Learning/Limitations:No    Signed     Kashmir Peña RN    4/8/2021   2:50 AM

## 2021-04-08 NOTE — PROGRESS NOTES
Called ER/RN Wilfredo and notified her that patient needs new IV access for CTA, they will call when ready/has new IV.

## 2021-04-08 NOTE — ED NOTES
Pt still crying in room not verbally responding to questions.  Glenis with registration unable to confirm information at this time

## 2021-04-08 NOTE — ED NOTES
Palliative Medicine Initial visit  Seen by: Lucia BREWSTER  Ordering Physician: Dr. TELLO Dunlap  Reason for Consult: overall poor prognosis  # of hospitalizations in the past year: 1  Advanced Care Document: POA: Sister Shital      POLST: no   CODE STATUS: full code                                                                                                      Chief Complaint/HPI: 61 year old female with a past medical history of substance use disorder, cirrhosis, hematemesis, afib, GI bleed, hypertension, portal hypertension.  Patient was living in a nursing home after falling and fracturing her femur. Per sister/POA, when COVID hit and visitors were no longer allowed, patient found a way to start drinking again and when the patient drinks, she becomes belligerent.  The patient had been estranged  from her  and he came back into the picture so the patient asked him to pick her up from the nursing home and bring her home. She left the nursing home AMA. A day later she was vomiting blood so EMS was called and patient was brought in.  Patient is currently sitting up in the chair having lunch fed to her.  Her hands are shaking. She is able to move a cup to her mouth but it is uncoordinated. Patient was found to have esophageal varices  and had banding.  Talked to patient about her substance abuse. Patient stated that she has been drinking since she was 14 and drank during her pregancies. The sister stated that patient was using crack cocaine when she was 31 and had her children taken away, went into rehab and when she got out she thinks she started drinking excessively after that.  Patient stated that she wants to stop drinking and is willing to go to rehab to stop. She  stated that she does not feel like herself when she is not drinking and that people do not like her when she is drinking and everyone would support her stopping drinking.  Past Medical History: See HPI    Past Surgeries:   XR at bedside esophageal banding,   Family History: sister uterine and breast cancer; other sister bilateral breast cancer; mother breast cancer.  Social History: Patient has been at a nursing home for about a year.  Recently left AMA to move in with her , whom she has been  to for about 35 years but  from for 20 years.  rarely drinks due to diabetes. Patient previously worked as a . Has 3 step children and 4 biological children. Smokes about 8 cigarettes a day. Vodka is her drink of choice for alcohol and she states she drinks 1/2 pint daily.   Allergies (1) Active Reaction  NKA None Documented    Medications (21) Active  Scheduled: (13)  albuterol 0.083% neb Soln  2.5 mg 3 mL, Nebulized, Q4RT  diltiazem 180 mg/24 hours CD Cap  180 mg 1 cap, PO, BID  folic acid 1 mg Tab  1 mg 1 tab, PO, qDay  furosemide 40 mg Tab  40 mg 1 tab, PO, qDayACB  hydrocortisone sod succ 100 mg Vial PF  50 mg 1 mL, IV Push, q12hrS  Initiate Magnesium Replacement Protocol  Per MD Order, qDay  Initiate Potassium Replacement Protocol  Per MD Order, qDay  insulin lispro 100 units/mL Vial  per sliding scale, SubQ, q6hr  nicotine 14 mg/24 hr Patch  1 patch, Transdermal, qDay  nicotine Patch Removal  1 patch, Topical, qDay  pantoprazole 40 mg IV Vial  40 mg 1 vial, IV Push, BID  thiamine 100 mg Tab  100 mg 1 tab, PO, qDay  vancomycin 125 mg/2.5 mL (50mg/mL) oral liquid  250 mg 5 mL, OG-Tube, q6hr  Continuous: (0)  PRN: (8)  acetaminophen 650 mg Supp  650 mg 1 supp, OR, q4hr  Dextrose 50% Syringe  12.5 g 25 mL, IV Push, PRN  glucagon recomb 1 mg Vial  1 mg, IM, PRN  ondansetron 4 mg/2 mL Vial  4 mg 2 mL, IV Push, q4hr  Oral Glucose Gel (40% dextrose)  15 g 37.6 mL, PO, PRN  sodium chloride 0.9% 10mL Syringe  20 mL, IV Push, PRN  sodium chloride 0.9% 3mL Syringe  10 mL, IV Push, PRN  sodium chloride 0.9% 3mL Syringe  10 mL, IV Push, PRN          Review of Systems: 10 systems reviewed. Positive for pain, shaking,  weakness, fatigue and anxiety. Rest negative.  Physical Assessment:  Vital Signs (last 24 hrs)_____ Last Charted___________Minimum____________ Maximum____________  Temp    L 97.5 (JUL 07 12:35) L 97.5 (JUL 07 08:20) 98.6  (JUL 06 22:30)  Heart Rate   H 101 (JUL 07 16:10) 91  (JUL 07 03:06) H 105 (JUL 07 15:30)  Resp Rate       16  (JUL 07 16:10) 14  (JUL 07 15:30) 20  (JUL 07 08:20)  SBP    118  (JUL 07 16:10) 116  (JUL 06 22:30) 125  (JUL 07 03:06)  DBP    70  (JUL 07 16:10) 64  (JUL 07 03:06) 76  (JUL 07 12:35)  Labs (Last four charted values)  WBC                  H 19.4 (JUL 07) H 19.2 (JUL 06) H 22.0 (JUL 05) H 24.7 (JUL 04)  Hgb                  11.0 (JUL 07) L 10.3 (JUL 06) 11.9 (JUL 05) 12.6 (JUL 04)  Hct                  33 (JUL 07) L 31 (JUL 06) 36 (JUL 05) 38 (JUL 04)  Plt                  L 105 (JUL 07) L 106 (JUL 06) L 121 (JUL 05) L 128 (JUL 04)  Na                   141 (JUL 07) 143 (JUL 06) H 146 (JUL 05) H 147 (JUL 05)  K                    3.6 (JUL 07) 3.6 (JUL 06) 3.6 (JUL 05) L 3.2 (JUL 05)  CO2                  H 41 (JUL 07) H 40 (JUL 06) H 39 (JUL 05) H 40 (JUL 05)  Cl                   L 96 (JUL 07) 99 (JUL 06) 101 (JUL 05) 101 (JUL 05)  Cr                   L 0.54 (JUL 07) 0.60 (JUL 06) 0.66 (JUL 05) 0.64 (JUL 05)  BUN                  H 21 (JUL 07) 19 (JUL 06) 17 (JUL 05) 18 (JUL 05)  Glucose              H 175 (JUL 07) H 177 (JUL 06) H 149 (JUL 05) H 181 (JUL 05)  Mg                   L 1.5 (JUL 07) 1.6 (JUL 06) L 1.5 (JUL 05) 1.9 (JUL 04)  Phos                 2.7 (JUL 07) 2.5 (JUL 06) 2.7 (JUL 05) 3.0 (JUL 04)  Ca                   9.5 (JUL 07) 8.9 (JUL 06) 9.2 (JUL 05) 9.1 (JUL 05)  PT                   H 22.5 (JUL 06) H 20.9 (JUL 05) H 22.5 (JUL 03) H 24.2 (JUN 29)  INR                  H 2.3 (JUL 06) H 2.2 (JUL 05) H 2.3 (JUL 03) H 2.5 (JUN 29)  PTT                  H 37 (JUN 18)   Troponin             H 0.27 (JUN 18) H 0.29 (JUN 18)       Constitutional: 61 year old female in no  apparent distress  HEENT: jaundice, missing front teeth, rest normal  Heart: tachycardic  Lungs: clear  Abdomen: distended  Neurologic: alert and oriented x2. Knows month and year. Does not know the name of this hospital.  Skin: jaundiced   Psychology: pleasantly confused  Psych/Social:discussed substance use disorder and inpatient rehab. She stated that she went through rehab once but lasted only a week after she got out and that was a very long time ago. She stated she would be interested in going because she knows that people don't like how she acts when she is drinking and she wants to stop drinking but knows she cannot do it on her own.   Spiritual: has a spiritual foundation  Pain Score: 0  Plan:   Domains of Care  1. Plan of Care: Patient would like to go into substance use disorder rehab. Concerned patient cannot get into a program if she is unable to feed herself. Will discuss with case management and see what options are available. Per sister/POA,  is willing to have her come home but if patient is able to go into a treatment program that would be the preference.  2. Physical Aspects: addiction, shakes, weakness, fatigue, inability to feed herself, anxiety.  3. Psychological Aspects: patient has memory issues and is unable to recall the name of this facility as well as recall other information, such as she stated she has 11 children when she has 4 children and 3 step children.  4. Psychosocial Aspects: she is supported by her siblings, her mother, and her .  5. Spiritual / Cultural Aspects: patient stated she has a spiritual foundation  6. Ethical / Legal: Sister, Shital, is her POA.  Paperwork on chart. SDM form on chart.  Discussed code status in depth. Shital wishes to change the patient from a partial LET to a full code.  Order entered. Discussed with RN.     Thank you for this consult. Palliative care will continue to follow this patient while here at the hospital.  Time: 80 minutes, > 50%  time spent counseling and supporting the patient as noted above and communicating with care team.          Electronically Signed On 07.07.2020 16:36  ___________________________________________________   Lucia Martinez NP

## 2021-04-08 NOTE — ED NOTES
Pt states she can see \"a little\" out of her L eye.  Pt appears much more calm and coherent compared to arrival.

## 2021-04-08 NOTE — ED NOTES
Pt refused bolus administration. Pt stated, \"I don't want that to make my blood pressure go up. I want to go home, I have kids at home. \" Provider made aware.

## 2021-04-08 NOTE — ED NOTES
Jasmyn Aguirre from [facility] reports she will come to get this pt as soon as she finishes w/ her other pt

## 2021-04-08 NOTE — ED PROVIDER NOTES
EMERGENCY DEPARTMENT HISTORY AND PHYSICAL EXAM      Date: 4/8/2021  Patient Name: Nevaeh Moreno    Please note that this dictation was completed with [employer], the computer voice recognition software. Quite often unanticipated grammatical, syntax, homophones, and other interpretive errors are inadvertently transcribed by the computer software. Please disregard these errors. Please excuse any errors that have escaped final proofreading. History of Presenting Illness     Chief Complaint   Patient presents with    Eye Pain    Flu Like Symptoms       History Provided By: Patient, limited by patient condition, nursing, EMS    HPI: Nevaeh Moreno, 35 y.o. female, with a past medical history significant for hypertension presenting the emergency department brought in by EMS with reported fever and eye pain and headache. Patient is not able to provide much history for me in the room she is crying hysterically on evaluation complaining of pressure in her head. The nurse reported that she indicated it was behind the left eye. She cannot give me any history now while I am trying to obtain in HPI. Nursing reported that she stated her pain was severe earlier. Temperature was 100.4 prior to arrival.    PCP: Brent Don MD    No current facility-administered medications on file prior to encounter. Current Outpatient Medications on File Prior to Encounter   Medication Sig Dispense Refill    ondansetron (ZOFRAN ODT) 4 mg disintegrating tablet Take 1 Tab by mouth every eight (8) hours as needed for Nausea. 20 Tab 0    naproxen sodium (MIDOL, NAPROXEN,) 220 mg tablet Take 1 Tab by mouth two (2) times daily (with meals). 20 Tab 0    sertraline (ZOLOFT) 25 mg tablet Take 1 Tab by mouth daily. Indications: ANXIETY WITH DEPRESSION 30 Tab 0    prenatal vit-iron fumarate-fa (PRENATAL PLUS WITH IRON) 28 mg iron- 800 mcg tab Take 1 Tab by mouth daily.  Indications: pregnancy 30 Tab 0       Past History Past Medical History:  Past Medical History:   Diagnosis Date    Hypertension     Other ill-defined conditions(799.89)     muscle spasms    Other ill-defined conditions(799.89)     a fib    Other ill-defined conditions(799.89)     angina       Past Surgical History:  Past Surgical History:   Procedure Laterality Date    HX ORTHOPAEDIC      left knee       Family History:  Family History   Problem Relation Age of Onset    Diabetes Mother     Hypertension Mother     Diabetes Maternal Grandfather     Hypertension Maternal Grandfather        Social History:  Social History     Tobacco Use    Smoking status: Current Every Day Smoker     Packs/day: 1.50     Years: 0.50     Pack years: 0.75    Smokeless tobacco: Never Used   Substance Use Topics    Alcohol use: No     Comment: occassionally    Drug use: Yes     Types: Marijuana       Allergies: Allergies   Allergen Reactions    Other Food Anaphylaxis     ONION    Onion Anaphylaxis         Review of Systems   Review of Systems   Unable to perform ROS: Mental status change       Physical Exam   Physical Exam  Vitals signs and nursing note reviewed. Constitutional:       General: She is in acute distress. Appearance: She is well-developed. She is diaphoretic. Comments: Patient is awake, crying hysterically, she is able to tell me her name, her location. HENT:      Head: Normocephalic and atraumatic. Eyes:      General:         Right eye: No discharge. Left eye: No discharge. Conjunctiva/sclera: Conjunctivae normal.      Pupils: Pupils are equal, round, and reactive to light. Neck:      Musculoskeletal: Normal range of motion and neck supple. Trachea: No tracheal deviation. Comments: No neck rigidity, actively ranging her neck. Range of motion full  Cardiovascular:      Rate and Rhythm: Normal rate and regular rhythm. Heart sounds: Normal heart sounds. No murmur.    Pulmonary:      Effort: Pulmonary effort is normal. No respiratory distress. Breath sounds: Normal breath sounds. No wheezing or rales. Abdominal:      General: Bowel sounds are normal.      Palpations: Abdomen is soft. Tenderness: There is no abdominal tenderness. There is no guarding or rebound. Musculoskeletal: Normal range of motion. General: No tenderness or deformity. Skin:     General: Skin is warm. Findings: No erythema or rash. Neurological:      Comments: Patient moving all extremities. No lateralizing deficit. Psychiatric:         Behavior: Behavior normal.         Diagnostic Study Results     Labs -     Recent Results (from the past 12 hour(s))   CBC WITH AUTOMATED DIFF    Collection Time: 04/08/21  3:12 AM   Result Value Ref Range    WBC 5.0 3.6 - 11.0 K/uL    RBC 4.11 3.80 - 5.20 M/uL    HGB 12.6 11.5 - 16.0 g/dL    HCT 37.4 35.0 - 47.0 %    MCV 91.0 80.0 - 99.0 FL    MCH 30.7 26.0 - 34.0 PG    MCHC 33.7 30.0 - 36.5 g/dL    RDW 14.5 11.5 - 14.5 %    PLATELET 402 194 - 194 K/uL    MPV 9.6 8.9 - 12.9 FL    NRBC 0.0 0  WBC    ABSOLUTE NRBC 0.00 0.00 - 0.01 K/uL    NEUTROPHILS 75 32 - 75 %    LYMPHOCYTES 13 12 - 49 %    MONOCYTES 10 5 - 13 %    EOSINOPHILS 1 0 - 7 %    BASOPHILS 1 0 - 1 %    IMMATURE GRANULOCYTES 0 0.0 - 0.5 %    ABS. NEUTROPHILS 3.6 1.8 - 8.0 K/UL    ABS. LYMPHOCYTES 0.7 (L) 0.8 - 3.5 K/UL    ABS. MONOCYTES 0.5 0.0 - 1.0 K/UL    ABS. EOSINOPHILS 0.1 0.0 - 0.4 K/UL    ABS. BASOPHILS 0.1 0.0 - 0.1 K/UL    ABS. IMM.  GRANS. 0.0 0.00 - 0.04 K/UL    DF SMEAR SCANNED      RBC COMMENTS NORMOCYTIC, NORMOCHROMIC     METABOLIC PANEL, COMPREHENSIVE    Collection Time: 04/08/21  3:12 AM   Result Value Ref Range    Sodium 140 136 - 145 mmol/L    Potassium 3.4 (L) 3.5 - 5.1 mmol/L    Chloride 103 97 - 108 mmol/L    CO2 25 21 - 32 mmol/L    Anion gap 12 5 - 15 mmol/L    Glucose 113 (H) 65 - 100 mg/dL    BUN 4 (L) 6 - 20 MG/DL    Creatinine 0.86 0.55 - 1.02 MG/DL    BUN/Creatinine ratio 5 (L) 12 - 20 GFR est AA >60 >60 ml/min/1.73m2    GFR est non-AA >60 >60 ml/min/1.73m2    Calcium 9.0 8.5 - 10.1 MG/DL    Bilirubin, total 0.3 0.2 - 1.0 MG/DL    ALT (SGPT) 15 12 - 78 U/L    AST (SGOT) 15 15 - 37 U/L    Alk. phosphatase 149 (H) 45 - 117 U/L    Protein, total 7.9 6.4 - 8.2 g/dL    Albumin 3.9 3.5 - 5.0 g/dL    Globulin 4.0 2.0 - 4.0 g/dL    A-G Ratio 1.0 (L) 1.1 - 2.2     HCG QL SERUM    Collection Time: 04/08/21  3:12 AM   Result Value Ref Range    HCG, Ql. Negative NEG     ETHYL ALCOHOL    Collection Time: 04/08/21  3:25 AM   Result Value Ref Range    ALCOHOL(ETHYL),SERUM <10 <10 MG/DL   DRUG SCREEN, URINE    Collection Time: 04/08/21  4:51 AM   Result Value Ref Range    AMPHETAMINES Negative NEG      BARBITURATES Negative NEG      BENZODIAZEPINES Negative NEG      COCAINE Negative NEG      METHADONE Negative NEG      OPIATES Negative NEG      PCP(PHENCYCLIDINE) Negative NEG      THC (TH-CANNABINOL) Positive (A) NEG      Drug screen comment (NOTE)        Radiologic Studies -   CT HEAD WO CONT   Final Result   No acute intracranial process. XR CHEST PORT   Final Result   No evidence of acute cardiopulmonary process. CTA HEAD NECK W CONT    (Results Pending)     CT Results  (Last 48 hours)               04/08/21 0359  CT HEAD WO CONT Final result    Impression:  No acute intracranial process. Narrative:  EXAM: CT HEAD WO CONT       INDICATION: altered, fever       COMPARISON: None. CONTRAST: None. TECHNIQUE: Unenhanced CT of the head was performed using 5 mm images. Brain and   bone windows were generated. Coronal and sagittal reformats. CT dose reduction   was achieved through use of a standardized protocol tailored for this   examination and automatic exposure control for dose modulation. FINDINGS:   The ventricles and sulci are normal in size, shape and configuration. . There is   no significant white matter disease.  There is no intracranial hemorrhage, extra-axial collection, or mass effect. The basilar cisterns are open. No CT   evidence of acute infarct. The bone windows demonstrate no abnormalities. The visualized portions of the   paranasal sinuses and mastoid air cells are clear. CXR Results  (Last 48 hours)               04/08/21 0358  XR CHEST PORT Final result    Impression:  No evidence of acute cardiopulmonary process. Narrative:  INDICATION: Fever       FINDINGS: AP portable imaging of the chest performed at 3:47 AM demonstrates a   normal cardiomediastinal silhouette. The lungs are clear bilaterally. No   significant osseous abnormalities are seen. Medical Decision Making   I am the first provider for this patient. I reviewed the vital signs, available nursing notes, past medical history, past surgical history, family history and social history. Vital Signs-Reviewed the patient's vital signs. Patient Vitals for the past 12 hrs:   Temp Pulse Resp BP SpO2   04/08/21 0457  60 16 (!) 143/84    04/08/21 0339    (!) 188/102    04/08/21 0254 98.5 °F (36.9 °C) 71 20 (!) 196/133 100 %   04/08/21 0200 100.4 °F (38 °C) (!) 58 18 (!) 167/84 100 %           Records Reviewed:   Nursing notes, Prior visits     Provider Notes (Medical Decision Making):   Ill-appearing female, she is in severe distress on evaluation. HPI is limited secondary to patient's change in mental status. She is crying hysterically, but also seems to have trouble answering questions. I do not note any lateralizing deficit, we do not know when she was last known well. Family had told EMS that she had been in bed all day and then cried out that she was feeling sick. We will go ahead and treat headache with Compazine, will treat agitation/anxiety with Ativan. Patient will need to go over for emergent head CT to rule out subarachnoid hemorrhage/other acute abnormality in the brain.   Differential diagnosis includes hemorrhagic stroke, subarachnoid hemorrhage, dural venous thrombus is possible, but less likely. Other sources of infection could include Covid, UTI. There does not appear to be an acute abdomen on exam.  No other cutaneous source of infection. ED Course:   Initial assessment performed. The patients presenting problems have been discussed, and they are in agreement with the care plan formulated and outlined with them. I have encouraged them to ask questions as they arise throughout their visit. Time 5:10 AM.  Patient requesting discharge to home at this time. She states that her headache is improved, she is feeling better, vision is normal in the left eye now, she is able to count fingers, has no visual disturbances. I informed her that in my opinion going home is unsafe, I recommended that we hospitalize her for further work-up. I relayed that I wanted a CTA of the blood vessels in her head to look for aneurysm or other evidence of stroke. I also recommended that she be hospitalized for an MRI of the brain and further monitoring. I relayed the risks of leaving could be return of symptoms, neurologic injury or death. Patient states that if anything changes or gets worse she will come back. Critical Care Time:   none    Disposition:    DISCHARGE NOTE  Patients results have been reviewed with them. Patient and/or family have verbally conveyed their understanding and agreement of the patient's signs, symptoms, diagnosis, treatment and prognosis and additionally agree to follow up as recommended or return to the Emergency Room should their condition change or have any new concerns prior to their follow-up appointment. Patient verbally agrees with the care-plan and verbally conveys that all of their questions have been answered.    Discharge instructions have also been provided to the patient with some educational information regarding their diagnosis as well a list of reasons why they would want to return to the ER prior to their follow-up appointment should their condition change. PLAN:  1. Current Discharge Medication List        2. Follow-up Information     Follow up With Specialties Details Why Contact Info    Traci Khan MD General Practice Schedule an appointment as soon as possible for a visit   [address]      [facility] Neurology Schedule an appointment as soon as possible for a visit   [address] [facility] [address]   [address] [facility] EMERGENCY DEPT Emergency Medicine  If symptoms worsen [facility]  [phone]          Return to ED if worse     Diagnosis     Clinical Impression:   1. Nonintractable headache, unspecified chronicity pattern, unspecified headache type    2. Visual disturbance    3. Elevated blood pressure reading    4. Left against medical advice        Attestations:   This note was completed by Mirna Hernández DO

## 2021-04-08 NOTE — ED NOTES
Pt not verbally responding during assessment. Pt is crying and holding her L eye.  Will attempt assessment again

## 2021-04-09 ENCOUNTER — PATIENT OUTREACH (OUTPATIENT)
Dept: FAMILY MEDICINE CLINIC | Age: 31
End: 2021-04-09

## 2021-04-12 NOTE — PROGRESS NOTES
Patient resolved from Transition of Care episode on 4/12.2021. ACM/CTN was unsuccessful at contacting this patient today. Patient/family was provided the following resources and education related to COVID-19 during the initial call:                         Signs, symptoms and red flags related to COVID-19            CDC exposure and quarantine guidelines            [facility] exposure contact - [phone]            Contact for their local Department of Health                 Patient has not had any additional ED or hospital visits. No further outreach scheduled with this CTN/ACM. Episode of Care resolved. Patient has this CTN/ACM contact information if future needs arise.

## 2021-07-22 ENCOUNTER — HOSPITAL ENCOUNTER (EMERGENCY)
Age: 31
Discharge: HOME | End: 2021-07-22
Attending: EMERGENCY MEDICINE
Payer: MEDICAID

## 2021-07-22 ENCOUNTER — APPOINTMENT (OUTPATIENT)
Dept: CT IMAGING | Age: 31
End: 2021-07-22
Attending: EMERGENCY MEDICINE
Payer: MEDICAID

## 2021-07-22 VITALS
TEMPERATURE: 97.8 F | SYSTOLIC BLOOD PRESSURE: 151 MMHG | WEIGHT: 209 LBS | RESPIRATION RATE: 11 BRPM | DIASTOLIC BLOOD PRESSURE: 94 MMHG | BODY MASS INDEX: 28.31 KG/M2 | HEART RATE: 46 BPM | OXYGEN SATURATION: 99 % | HEIGHT: 72 IN

## 2021-07-22 DIAGNOSIS — K80.20 CALCULUS OF GALLBLADDER WITHOUT CHOLECYSTITIS WITHOUT OBSTRUCTION: ICD-10-CM

## 2021-07-22 DIAGNOSIS — M54.6 ACUTE MIDLINE THORACIC BACK PAIN: Primary | ICD-10-CM

## 2021-07-22 LAB
ALBUMIN SERPL-MCNC: 3.5 G/DL (ref 3.5–5)
ALBUMIN/GLOB SERPL: 0.9 {RATIO} (ref 1.1–2.2)
ALP SERPL-CCNC: 168 U/L (ref 45–117)
ALT SERPL-CCNC: 13 U/L (ref 12–78)
ANION GAP SERPL CALC-SCNC: 8 MMOL/L (ref 5–15)
APPEARANCE UR: CLEAR
AST SERPL-CCNC: 11 U/L (ref 15–37)
BACTERIA URNS QL MICRO: NEGATIVE /HPF
BASOPHILS # BLD: 0.1 K/UL (ref 0–0.1)
BASOPHILS NFR BLD: 1 % (ref 0–1)
BILIRUB SERPL-MCNC: 0.1 MG/DL (ref 0.2–1)
BILIRUB UR QL: NEGATIVE
BUN SERPL-MCNC: 7 MG/DL (ref 6–20)
BUN/CREAT SERPL: 8 (ref 12–20)
CALCIUM SERPL-MCNC: 8.4 MG/DL (ref 8.5–10.1)
CHLORIDE SERPL-SCNC: 107 MMOL/L (ref 97–108)
CO2 SERPL-SCNC: 26 MMOL/L (ref 21–32)
COLOR UR: NORMAL
CREAT SERPL-MCNC: 0.84 MG/DL (ref 0.55–1.02)
DIFFERENTIAL METHOD BLD: NORMAL
EOSINOPHIL # BLD: 0.3 K/UL (ref 0–0.4)
EOSINOPHIL NFR BLD: 4 % (ref 0–7)
EPITH CASTS URNS QL MICRO: NORMAL /LPF
ERYTHROCYTE [DISTWIDTH] IN BLOOD BY AUTOMATED COUNT: 14.2 % (ref 11.5–14.5)
GLOBULIN SER CALC-MCNC: 3.9 G/DL (ref 2–4)
GLUCOSE SERPL-MCNC: 103 MG/DL (ref 65–100)
GLUCOSE UR STRIP.AUTO-MCNC: NEGATIVE MG/DL
HCG UR QL: NEGATIVE
HCT VFR BLD AUTO: 37.2 % (ref 35–47)
HGB BLD-MCNC: 12.5 G/DL (ref 11.5–16)
HGB UR QL STRIP: NEGATIVE
IMM GRANULOCYTES # BLD AUTO: 0 K/UL (ref 0–0.04)
IMM GRANULOCYTES NFR BLD AUTO: 0 % (ref 0–0.5)
KETONES UR QL STRIP.AUTO: NEGATIVE MG/DL
LEUKOCYTE ESTERASE UR QL STRIP.AUTO: NEGATIVE
LIPASE SERPL-CCNC: 122 U/L (ref 73–393)
LYMPHOCYTES # BLD: 3.3 K/UL (ref 0.8–3.5)
LYMPHOCYTES NFR BLD: 49 % (ref 12–49)
MCH RBC QN AUTO: 31.2 PG (ref 26–34)
MCHC RBC AUTO-ENTMCNC: 33.6 G/DL (ref 30–36.5)
MCV RBC AUTO: 92.8 FL (ref 80–99)
MONOCYTES # BLD: 0.3 K/UL (ref 0–1)
MONOCYTES NFR BLD: 5 % (ref 5–13)
NEUTS SEG # BLD: 2.7 K/UL (ref 1.8–8)
NEUTS SEG NFR BLD: 41 % (ref 32–75)
NITRITE UR QL STRIP.AUTO: NEGATIVE
NRBC # BLD: 0 K/UL (ref 0–0.01)
NRBC BLD-RTO: 0 PER 100 WBC
PH UR STRIP: 6.5 [PH] (ref 5–8)
PLATELET # BLD AUTO: 347 K/UL (ref 150–400)
PMV BLD AUTO: 9.7 FL (ref 8.9–12.9)
POTASSIUM SERPL-SCNC: 4.4 MMOL/L (ref 3.5–5.1)
PROT SERPL-MCNC: 7.4 G/DL (ref 6.4–8.2)
PROT UR STRIP-MCNC: NEGATIVE MG/DL
RBC # BLD AUTO: 4.01 M/UL (ref 3.8–5.2)
RBC #/AREA URNS HPF: NORMAL /HPF (ref 0–5)
SODIUM SERPL-SCNC: 141 MMOL/L (ref 136–145)
SP GR UR REFRACTOMETRY: 1.02 (ref 1–1.03)
UA: UC IF INDICATED,UAUC: NORMAL
UROBILINOGEN UR QL STRIP.AUTO: 0.2 EU/DL (ref 0.2–1)
WBC # BLD AUTO: 6.7 K/UL (ref 3.6–11)
WBC URNS QL MICRO: NORMAL /HPF (ref 0–4)

## 2021-07-22 PROCEDURE — 99285 EMERGENCY DEPT VISIT HI MDM: CPT

## 2021-07-22 PROCEDURE — 81001 URINALYSIS AUTO W/SCOPE: CPT

## 2021-07-22 PROCEDURE — 74011250636 HC RX REV CODE- 250/636: Performed by: EMERGENCY MEDICINE

## 2021-07-22 PROCEDURE — 81025 URINE PREGNANCY TEST: CPT

## 2021-07-22 PROCEDURE — 74176 CT ABD & PELVIS W/O CONTRAST: CPT

## 2021-07-22 PROCEDURE — 96374 THER/PROPH/DIAG INJ IV PUSH: CPT

## 2021-07-22 PROCEDURE — 83690 ASSAY OF LIPASE: CPT

## 2021-07-22 PROCEDURE — 80053 COMPREHEN METABOLIC PANEL: CPT

## 2021-07-22 PROCEDURE — 85025 COMPLETE CBC W/AUTO DIFF WBC: CPT

## 2021-07-22 PROCEDURE — 36415 COLL VENOUS BLD VENIPUNCTURE: CPT

## 2021-07-22 RX ORDER — KETOROLAC TROMETHAMINE 30 MG/ML
30 INJECTION, SOLUTION INTRAMUSCULAR; INTRAVENOUS
Status: COMPLETED | OUTPATIENT
Start: 2021-07-22 | End: 2021-07-22

## 2021-07-22 RX ORDER — NAPROXEN 500 MG/1
500 TABLET ORAL
Qty: 20 TABLET | Refills: 0 | Status: SHIPPED | OUTPATIENT
Start: 2021-07-22 | End: 2022-01-14

## 2021-07-22 RX ADMIN — KETOROLAC TROMETHAMINE 30 MG: 30 INJECTION, SOLUTION INTRAMUSCULAR; INTRAVENOUS at 10:46

## 2021-07-22 NOTE — ED PROVIDER NOTES
EMERGENCY DEPARTMENT HISTORY AND PHYSICAL EXAM      Date: 7/22/2021  Patient Name: Mariela Lemus    History of Presenting Illness     Chief Complaint   Patient presents with    Back Pain     History Provided By: Patient    HPI: Mariela Lemus, 28 y.o. female with past medical history significant for hypertension, muscle spasms, and atrial fibrillation who presents via EMS to the ED with cc of thoracic back pain that radiates to her abdomen that started this morning when she woke up. Patient describes the pain as a spasm/aching pain that is constant in nature but waxes and wanes. She denies any urinary symptoms including dysuria, hematuria, or urinary frequency. She has had the symptoms in the past after her child was born, approximately 11 months ago but has not had them since. She denies any heavy lifting or strenuous activity. PMHx: Hypertension, muscle spasms, atrial fibrillation  Social Hx: Smokes 1 pack/day, occasional alcohol use, occasional marijuana use    PCP: Guanakito Mendieta MD    There are no other complaints, changes, or physical findings at this time. No current facility-administered medications on file prior to encounter. Current Outpatient Medications on File Prior to Encounter   Medication Sig Dispense Refill    ondansetron (ZOFRAN ODT) 4 mg disintegrating tablet Take 1 Tab by mouth every eight (8) hours as needed for Nausea. 20 Tab 0    [DISCONTINUED] naproxen sodium (MIDOL, NAPROXEN,) 220 mg tablet Take 1 Tab by mouth two (2) times daily (with meals). 20 Tab 0    sertraline (ZOLOFT) 25 mg tablet Take 1 Tab by mouth daily. Indications: ANXIETY WITH DEPRESSION 30 Tab 0    prenatal vit-iron fumarate-fa (PRENATAL PLUS WITH IRON) 28 mg iron- 800 mcg tab Take 1 Tab by mouth daily.  Indications: pregnancy 30 Tab 0     Past History     Past Medical History:  Past Medical History:   Diagnosis Date    Hypertension     Other ill-defined conditions(419.95)     muscle spasms    Other ill-defined conditions(799.89)     a fib    Other ill-defined conditions(799.89)     angina     Past Surgical History:  Past Surgical History:   Procedure Laterality Date    HX ORTHOPAEDIC      left knee     Family History:  Family History   Problem Relation Age of Onset    Diabetes Mother     Hypertension Mother     Diabetes Maternal Grandfather     Hypertension Maternal Grandfather      Social History:  Social History     Tobacco Use    Smoking status: Current Every Day Smoker     Packs/day: 1.50     Years: 0.50     Pack years: 0.75    Smokeless tobacco: Never Used   Substance Use Topics    Alcohol use: No     Comment: occassionally    Drug use: Yes     Types: Marijuana     Allergies: Allergies   Allergen Reactions    Other Food Anaphylaxis     ONION    Onion Anaphylaxis     Review of Systems   Review of Systems   Constitutional: Negative for chills and fever. HENT: Negative for congestion, rhinorrhea, sneezing and sore throat. Eyes: Negative for redness and visual disturbance. Respiratory: Negative for shortness of breath. Cardiovascular: Negative for leg swelling. Gastrointestinal: Positive for abdominal pain. Negative for nausea and vomiting. Genitourinary: Negative for difficulty urinating and frequency. Musculoskeletal: Positive for back pain. Negative for myalgias and neck stiffness. Skin: Negative for rash. Neurological: Negative for dizziness, syncope, weakness and headaches. Hematological: Negative for adenopathy. Psychiatric/Behavioral: The patient is nervous/anxious. All other systems reviewed and are negative. Physical Exam   Physical Exam  Vitals and nursing note reviewed. Constitutional:       Appearance: Normal appearance. She is well-developed. Comments: Tearful   HENT:      Head: Normocephalic and atraumatic. Eyes:      Conjunctiva/sclera: Conjunctivae normal.   Cardiovascular:      Rate and Rhythm: Regular rhythm. Bradycardia present. Pulses: Normal pulses. Heart sounds: Normal heart sounds, S1 normal and S2 normal. No murmur heard. Pulmonary:      Effort: Pulmonary effort is normal. No respiratory distress. Breath sounds: Normal breath sounds. No wheezing. Abdominal:      General: Bowel sounds are normal. There is no distension. Palpations: Abdomen is soft. Tenderness: There is no abdominal tenderness. There is no rebound. Musculoskeletal:         General: Normal range of motion. Cervical back: Full passive range of motion without pain, normal range of motion and neck supple. Thoracic back: Spasms, tenderness and bony tenderness present. No deformity. Normal range of motion. No scoliosis. Skin:     General: Skin is warm and dry. Findings: No rash. Neurological:      Mental Status: She is alert and oriented to person, place, and time. Psychiatric:         Mood and Affect: Mood is anxious. Speech: Speech normal.         Behavior: Behavior normal.         Thought Content: Thought content normal.         Judgment: Judgment normal.       Diagnostic Study Results   Labs -     Recent Results (from the past 12 hour(s))   HCG URINE, QL. - POC    Collection Time: 07/22/21 10:26 AM   Result Value Ref Range    Pregnancy test,urine (POC) Negative NEG     CBC WITH AUTOMATED DIFF    Collection Time: 07/22/21 10:27 AM   Result Value Ref Range    WBC 6.7 3.6 - 11.0 K/uL    RBC 4.01 3.80 - 5.20 M/uL    HGB 12.5 11.5 - 16.0 g/dL    HCT 37.2 35.0 - 47.0 %    MCV 92.8 80.0 - 99.0 FL    MCH 31.2 26.0 - 34.0 PG    MCHC 33.6 30.0 - 36.5 g/dL    RDW 14.2 11.5 - 14.5 %    PLATELET 601 844 - 760 K/uL    MPV 9.7 8.9 - 12.9 FL    NRBC 0.0 0  WBC    ABSOLUTE NRBC 0.00 0.00 - 0.01 K/uL    NEUTROPHILS 41 32 - 75 %    LYMPHOCYTES 49 12 - 49 %    MONOCYTES 5 5 - 13 %    EOSINOPHILS 4 0 - 7 %    BASOPHILS 1 0 - 1 %    IMMATURE GRANULOCYTES 0 0.0 - 0.5 %    ABS. NEUTROPHILS 2.7 1.8 - 8.0 K/UL    ABS.  LYMPHOCYTES 3.3 0.8 - 3.5 K/UL    ABS. MONOCYTES 0.3 0.0 - 1.0 K/UL    ABS. EOSINOPHILS 0.3 0.0 - 0.4 K/UL    ABS. BASOPHILS 0.1 0.0 - 0.1 K/UL    ABS. IMM. GRANS. 0.0 0.00 - 0.04 K/UL    DF AUTOMATED     METABOLIC PANEL, COMPREHENSIVE    Collection Time: 07/22/21 10:27 AM   Result Value Ref Range    Sodium 141 136 - 145 mmol/L    Potassium 4.4 3.5 - 5.1 mmol/L    Chloride 107 97 - 108 mmol/L    CO2 26 21 - 32 mmol/L    Anion gap 8 5 - 15 mmol/L    Glucose 103 (H) 65 - 100 mg/dL    BUN 7 6 - 20 MG/DL    Creatinine 0.84 0.55 - 1.02 MG/DL    BUN/Creatinine ratio 8 (L) 12 - 20      GFR est AA >60 >60 ml/min/1.73m2    GFR est non-AA >60 >60 ml/min/1.73m2    Calcium 8.4 (L) 8.5 - 10.1 MG/DL    Bilirubin, total 0.1 (L) 0.2 - 1.0 MG/DL    ALT (SGPT) 13 12 - 78 U/L    AST (SGOT) 11 (L) 15 - 37 U/L    Alk.  phosphatase 168 (H) 45 - 117 U/L    Protein, total 7.4 6.4 - 8.2 g/dL    Albumin 3.5 3.5 - 5.0 g/dL    Globulin 3.9 2.0 - 4.0 g/dL    A-G Ratio 0.9 (L) 1.1 - 2.2     URINALYSIS W/ REFLEX CULTURE    Collection Time: 07/22/21 10:27 AM    Specimen: Urine   Result Value Ref Range    Color YELLOW/STRAW      Appearance CLEAR CLEAR      Specific gravity 1.020 1.003 - 1.030      pH (UA) 6.5 5.0 - 8.0      Protein Negative NEG mg/dL    Glucose Negative NEG mg/dL    Ketone Negative NEG mg/dL    Bilirubin Negative NEG      Blood Negative NEG      Urobilinogen 0.2 0.2 - 1.0 EU/dL    Nitrites Negative NEG      Leukocyte Esterase Negative NEG      WBC 0-4 0 - 4 /hpf    RBC 0-5 0 - 5 /hpf    Epithelial cells FEW FEW /lpf    Bacteria Negative NEG /hpf    UA:UC IF INDICATED CULTURE NOT INDICATED BY UA RESULT CNI     LIPASE    Collection Time: 07/22/21 10:27 AM   Result Value Ref Range    Lipase 122 73 - 393 U/L       Radiologic Studies -   CT ABD PELV WO CONT   Final Result      Left lower lobe multifocal alveolitis versus early pneumonia   Cholelithiasis        CT ABD PELV WO CONT    Result Date: 7/22/2021  Left lower lobe multifocal alveolitis versus early pneumonia Cholelithiasis    Medical Decision Making   I am the first provider for this patient. I reviewed the vital signs, available nursing notes, past medical history, past surgical history, family history and social history. Vital Signs-Reviewed the patient's vital signs. Patient Vitals for the past 24 hrs:   Temp Pulse Resp BP SpO2   07/22/21 1033  (!) 47 11 (!) 148/83 99 %   07/22/21 1023   18     07/22/21 1010   24     07/22/21 0957 97.8 °F (36.6 °C) (!) 51 (!) 42 (!) 176/106 100 %     Pulse Oximetry Analysis - 99% on RA (normal)    Cardiac Monitor:   Rate: 51 bpm  Rhythm: Sinus bradycardia    Records Reviewed: Nursing Notes and Old Medical Records    Provider Notes (Medical Decision Making):   52-year-old female presents via EMS with midthoracic back pain that radiates to her abdomen that started this morning. Differential includes kidney stone, musculoskeletal strain, degenerative disc disease, pancreatitis, cholelithiasis, and cholecystitis. Will check basic labs, urinalysis, treat her pain, and CT her abdomen and pelvis to further assess. ED Course:   Initial assessment performed. The patients presenting problems have been discussed, and they are in agreement with the care plan formulated and outlined with them. I have encouraged them to ask questions as they arise throughout their visit. Progress Note  11:37 AM  I have re-evaluated pt she states that her pain has improved after the IV Toradol. Her CT shows cholelithiasis and questionable alveolitis versus early pneumonia. Patient denies any fevers, cough, or symptoms concerning for pneumonia. She does endorse a mild shortness of breath. Given she is afebrile and has a normal white count, will not start her on antibiotics at this time but if her symptoms worsen or do not improve, we will have patient return to the emergency department for reevaluation and can start her on antibiotics at that time.     Progress Note: Updated pt on all returned results and findings. Discussed the importance of proper follow up as referred below along with return precautions. Pt in agreement with the care plan and expresses agreement with and understanding of all items discussed. Disposition:  Discharge Note:  The pt is ready for discharge. The pt's signs, symptoms, diagnosis, and discharge instructions have been discussed and pt has conveyed their understanding. The pt is to follow up as recommended or return to ER should their symptoms worsen. Plan has been discussed and pt is in agreement. PLAN:  1. Current Discharge Medication List      START taking these medications    Details   naproxen (NAPROSYN) 500 mg tablet Take 1 Tablet by mouth every twelve (12) hours as needed for Pain. Qty: 20 Tablet, Refills: 0  Start date: 7/22/2021           2. Follow-up Information     Follow up With Specialties Details Why Contact Info    Jessica Greene MD General Practice Schedule an appointment as soon as possible for a visit  or with a new doctor at this office [address]      Erika Thompson MD General Surgery, Breast Surgery, Oncology Schedule an appointment as soon as possible for a visit  As needed [address] [place], [place] [address] [facility] [address] [phone]      [address] [place] [address] DEPT Emergency Medicine  As needed, If symptoms worsen [facility]  [phone]        Return to ED if worse     Diagnosis     Clinical Impression:   1. Acute midline thoracic back pain    2. Calculus of gallbladder without cholecystitis without obstruction            Please note that this dictation was completed with Dragon, computer voice recognition software. Quite often unanticipated grammatical, syntax, homophones, and other interpretive errors are inadvertently transcribed by the computer software. Please disregard these errors.   Additionally, please excuse any errors that have escaped final proofreading.

## 2021-07-22 NOTE — ED TRIAGE NOTES
Pt arrives by EMS from home. Pt reports onset of middle back pain that woke her up this morning. On arrival, the patient hyperventilating and she was slow to open her eyes and respond to nurses while attempting to evaluated and talk with patient. After a few minutes she verbally confirmed/provided her name and date of birth.

## 2021-07-22 NOTE — ED NOTES
When the provider came to the room and the patient opened her eyes and became more talkative she asked, \"where am I? \" We updated her on her location and she said the last thing she remembered was waking up with back pain ans she asked where her  was. I heard from EMS that he is on the way, so I let her know. Patient ambulated to restroom for urine specimen and I assisted her into a gown when she returned. Pt provided non skid socks. She speaks slowly and is child like. I asked if she knew what day is was today and she said she did not, I asked her the month and she replied, \"August, right? \" It is July. She did respond with the correct year.

## 2021-07-22 NOTE — ED NOTES
Pt is back from CT, asking for lunch, MD notified, patient advised that she will be able to eat after her CT results.

## 2022-01-14 ENCOUNTER — HOSPITAL ENCOUNTER (EMERGENCY)
Age: 32
Discharge: HOME | End: 2022-01-14
Attending: EMERGENCY MEDICINE
Payer: MEDICAID

## 2022-01-14 VITALS
OXYGEN SATURATION: 100 % | HEIGHT: 72 IN | HEART RATE: 95 BPM | RESPIRATION RATE: 18 BRPM | BODY MASS INDEX: 28.31 KG/M2 | DIASTOLIC BLOOD PRESSURE: 71 MMHG | WEIGHT: 209 LBS | SYSTOLIC BLOOD PRESSURE: 131 MMHG | TEMPERATURE: 97.5 F

## 2022-01-14 DIAGNOSIS — N61.1 BREAST ABSCESS: Primary | ICD-10-CM

## 2022-01-14 DIAGNOSIS — Z32.01 PREGNANCY TEST PERFORMED, PREGNANCY CONFIRMED: ICD-10-CM

## 2022-01-14 LAB — HCG UR QL: POSITIVE

## 2022-01-14 PROCEDURE — 74011250637 HC RX REV CODE- 250/637: Performed by: NURSE PRACTITIONER

## 2022-01-14 PROCEDURE — 99283 EMERGENCY DEPT VISIT LOW MDM: CPT

## 2022-01-14 PROCEDURE — 81025 URINE PREGNANCY TEST: CPT

## 2022-01-14 RX ORDER — CEPHALEXIN 500 MG/1
500 CAPSULE ORAL 2 TIMES DAILY
Qty: 14 CAPSULE | Refills: 0 | Status: SHIPPED | OUTPATIENT
Start: 2022-01-14 | End: 2022-01-21

## 2022-01-14 RX ORDER — ONDANSETRON 8 MG/1
8 TABLET, ORALLY DISINTEGRATING ORAL
Qty: 21 TABLET | Refills: 0 | Status: SHIPPED | OUTPATIENT
Start: 2022-01-14

## 2022-01-14 RX ORDER — ONDANSETRON 4 MG/1
8 TABLET, ORALLY DISINTEGRATING ORAL
Status: COMPLETED | OUTPATIENT
Start: 2022-01-14 | End: 2022-01-14

## 2022-01-14 RX ADMIN — ONDANSETRON 8 MG: 4 TABLET, ORALLY DISINTEGRATING ORAL at 17:21

## 2022-01-14 NOTE — DISCHARGE INSTRUCTIONS
It was a pleasure taking care of you at [facility] Emergency Department today. We know that when you come to [facility], you are entrusting us with your health, comfort, and safety. Our physicians and nurses honor that trust, and we truly appreciate the opportunity to care for you and your loved ones. We also value our feedback. If you receive a survey about your Emergency Department experience today, please fill it out. We care about our patients' feedback, and we listen to what you have to say. Thank you!

## 2022-01-14 NOTE — ED PROVIDER NOTES
EMERGENCY DEPARTMENT HISTORY AND PHYSICAL EXAM    Date: 2022  Patient Name: Aimee Singh    History of Presenting Illness     Chief Complaint   Patient presents with    Breast pain     to right x 2-3 months    Pregnancy Test         History Provided By: Patient    HPI: Aimee Singh is a 28 y.o. female with a PMH of Afib, HTN who presents with breast pain and pregnancy test.  Reports bump/abscess to her right nipple for approximately 2 to 3 months. She reports it occurs intermittently. Denies redness, swelling, discharge, fever or chills. Pt is concerned for pregnancy stating LMP 2 months ago. She is A0. Reports nausea but no vomiting, abd pain, diarrhea or vaginal sx. PCP: Dafne Braxton MD    Current Outpatient Medications   Medication Sig Dispense Refill    ondansetron (ZOFRAN ODT) 8 mg disintegrating tablet Take 1 Tablet by mouth every eight (8) hours as needed for Nausea or Vomiting. 21 Tablet 0    cephALEXin (Keflex) 500 mg capsule Take 1 Capsule by mouth two (2) times a day for 7 days. 14 Capsule 0       Past History     Past Medical History:  Past Medical History:   Diagnosis Date    Hypertension     Other ill-defined conditions(799.89)     muscle spasms    Other ill-defined conditions(799.89)     a fib    Other ill-defined conditions(799.89)     angina       Past Surgical History:  Past Surgical History:   Procedure Laterality Date    HX ORTHOPAEDIC      left knee       Family History:  Family History   Problem Relation Age of Onset    Diabetes Mother     Hypertension Mother     Diabetes Maternal Grandfather     Hypertension Maternal Grandfather        Social History:  Social History     Tobacco Use    Smoking status: Current Every Day Smoker     Packs/day: 1.50     Years: 0.50     Pack years: 0.75    Smokeless tobacco: Never Used   Substance Use Topics    Alcohol use: No     Comment: occassionally    Drug use: Yes     Types: Marijuana       Allergies:   Allergies Allergen Reactions    Other Food Anaphylaxis     ONION    Onion Anaphylaxis         Review of Systems   Review of Systems   Constitutional: Negative for appetite change, chills, fatigue and fever. HENT: Negative for congestion, ear pain, rhinorrhea and sore throat. Eyes: Negative for pain and itching. Respiratory: Negative for cough, chest tightness, shortness of breath and wheezing. Cardiovascular: Negative for chest pain, palpitations and leg swelling. Gastrointestinal: Positive for nausea. Negative for abdominal pain, constipation, diarrhea and vomiting. Genitourinary: Negative for dysuria, frequency and urgency. Musculoskeletal: Negative for arthralgias, back pain and joint swelling. Skin: Negative for color change and rash. Breast abscess    Neurological: Negative for dizziness, numbness and headaches. All other systems reviewed and are negative. Physical Exam     Vitals:    01/14/22 1548   BP: 131/71   Pulse: 95   Resp: 18   Temp: 97.5 °F (36.4 °C)   SpO2: 100%   Weight: 94.8 kg (209 lb)   Height: 6' 1\" (1.854 m)     Physical Exam  Vitals and nursing note reviewed. Constitutional:       General: She is not in acute distress. Appearance: She is well-developed. She is not ill-appearing. HENT:      Head: Normocephalic and atraumatic. Right Ear: Tympanic membrane and ear canal normal.      Left Ear: Tympanic membrane and ear canal normal.      Nose: Nose normal.      Mouth/Throat:      Mouth: Mucous membranes are moist.      Pharynx: Oropharynx is clear. No oropharyngeal exudate or posterior oropharyngeal erythema. Eyes:      Extraocular Movements: Extraocular movements intact. Conjunctiva/sclera: Conjunctivae normal.      Pupils: Pupils are equal, round, and reactive to light. Cardiovascular:      Rate and Rhythm: Normal rate and regular rhythm. Pulses: Normal pulses. Heart sounds: Normal heart sounds.    Pulmonary:      Effort: Pulmonary effort is normal.      Breath sounds: Normal breath sounds. Musculoskeletal:      Cervical back: Normal range of motion and neck supple. Skin:     General: Skin is warm and dry. Comments: 1cm nodule to R nipple. No palpable breast mass, nipple inversion, drainage or erythema    Neurological:      Mental Status: She is alert and oriented to person, place, and time. Diagnostic Study Results     Labs -     Recent Results (from the past 12 hour(s))   HCG URINE, QL. - POC    Collection Time: 01/14/22  4:54 PM   Result Value Ref Range    Pregnancy test,urine (POC) Positive (A) NEG         Radiologic Studies -   No orders to display     CT Results  (Last 48 hours)    None        CXR Results  (Last 48 hours)    None            Medical Decision Making   I am the first provider for this patient. I reviewed the vital signs, available nursing notes, past medical history, past surgical history, family history and social history. Vital Signs-Reviewed the patient's vital signs. Records Reviewed: Nursing Notes and Old Medical Records    Provider Notes (Medical Decision Making):     ED COURSE:   Initial assessment performed. The patients presenting problems have been discussed, and they are in agreement with the care plan formulated and outlined with them. I have encouraged them to ask questions as they arise throughout their visit. 25-year-old female presented for breast pain in addition to pregnancy test.  Pregnancy test is positive. Plan to discharge with Zofran. In addition we will give Keflex which is category B in pregnancy for possible abscess to the right nipple. Advised to follow-up with OB/GYN for further evaluation. DDX: abscess ,mastitis, carbuncle           Disposition:  Discharge     DISCHARGE NOTE:         Care plan outlined and precautions discussed. Patient has no new complaints, changes, or physical findings. All of pt's questions and concerns were addressed.  Patient was instructed and agrees to follow up with PCP, as well as to return to the ED upon further deterioration. Patient is ready to go home. Follow-up Information     Follow up With Specialties Details Why Contact Info    [facility] [address]  [phone]          Discharge Medication List as of 1/14/2022  5:12 PM      START taking these medications    Details   ondansetron (ZOFRAN ODT) 8 mg disintegrating tablet Take 1 Tablet by mouth every eight (8) hours as needed for Nausea or Vomiting., Print, Disp-21 Tablet, R-0      cephALEXin (Keflex) 500 mg capsule Take 1 Capsule by mouth two (2) times a day for 7 days. , Print, Disp-14 Capsule, R-0             Procedures:  Procedures    Please note that this dictation was completed with Dragon, computer voice recognition software. Quite often unanticipated grammatical, syntax, homophones, and other interpretive errors are inadvertently transcribed by the computer software. Please disregard these errors. Additionally, please excuse any errors that have escaped final proofreading. Diagnosis     Clinical Impression:   1. Breast abscess    2.  Pregnancy test performed, pregnancy confirmed

## 2022-01-14 NOTE — ED NOTES
Patient here for breast pain and requesting pregnancy test. States LMP about 2-3 months ago. Patient now c/o nausea and vomited into the trash can. Requesting medication for nausea.

## 2022-01-14 NOTE — ED TRIAGE NOTES
C/o intermittent right breast pain x 2-3 months.  Pt also reports she missed her last period and is requesting a pregnancy test.

## 2022-03-19 PROBLEM — F32.A DEPRESSION: Status: ACTIVE | Noted: 2018-02-27

## 2022-03-20 PROBLEM — I10 HYPERTENSION: Status: ACTIVE | Noted: 2018-02-28

## 2022-11-09 NOTE — INTERDISCIPLINARY ROUNDS
Behavioral Health Interdisciplinary Rounds     Patient Name: Jovanni Sevilla  Age: 33 y.o.   Room/Bed:  726/  Primary Diagnosis: Depression   Admission Status: Voluntary Commitment     Readmission within 30 days: no  Power of  in place: no  Patient requires a blocked bed: no          Reason for blocked bed:     VTE Prophylaxis: No  Flu vaccine given : no   Mobility needs/Fall risk: no    Nutritional Plan: no  Consults:          Labs/Testing due today?: no    Sleep hours:  6.45      Participation in Care/Groups:  no  Medication Compliant?: Yes  PRNS (last 24 hours): None    Restraints (last 24 hours):  no  Substance Abuse:  yes  CIWA (range last 24 hours):  COWS (range last 24 hours):   Alcohol screening (AUDIT) completed -  AUDIT Score: 0  If applicable, date SBIRT discussed in treatment team AND documented:   Tobacco - patient is a smoker: yes   Date tobacco education completed by RN: 2/28/18  24 hour chart check complete: yes     Patient goal(s) for today:   Treatment team focus/goals:   Progress note     LOS:  4  Expected LOS:     Financial concerns/prescription coverage:    Date of last family contact:       Family requesting physician contact today:    Discharge plan:   Guns in the home:        Outpatient provider(s):    Participating treatment team members: Jovanni Sevilla, * (assigned SW), Wartpeel Counseling:  I discussed with the patient the risks of Wartpeel including but not limited to erythema, scaling, itching, weeping, crusting, and pain.

## 2023-02-20 ENCOUNTER — HOSPITAL ENCOUNTER (EMERGENCY)
Age: 33
Discharge: HOME | End: 2023-02-20
Attending: EMERGENCY MEDICINE
Payer: MEDICAID

## 2023-02-20 VITALS
OXYGEN SATURATION: 100 % | HEIGHT: 72 IN | TEMPERATURE: 97.5 F | HEART RATE: 58 BPM | DIASTOLIC BLOOD PRESSURE: 82 MMHG | WEIGHT: 246.91 LBS | RESPIRATION RATE: 16 BRPM | BODY MASS INDEX: 33.44 KG/M2 | SYSTOLIC BLOOD PRESSURE: 150 MMHG

## 2023-02-20 DIAGNOSIS — M54.50 CHRONIC BILATERAL LOW BACK PAIN WITHOUT SCIATICA: ICD-10-CM

## 2023-02-20 DIAGNOSIS — G89.29 CHRONIC BILATERAL LOW BACK PAIN WITHOUT SCIATICA: ICD-10-CM

## 2023-02-20 DIAGNOSIS — N76.0 BV (BACTERIAL VAGINOSIS): ICD-10-CM

## 2023-02-20 DIAGNOSIS — N89.8 VAGINAL LESION: ICD-10-CM

## 2023-02-20 DIAGNOSIS — B96.89 BV (BACTERIAL VAGINOSIS): ICD-10-CM

## 2023-02-20 DIAGNOSIS — J03.00 ACUTE NON-RECURRENT STREPTOCOCCAL TONSILLITIS: Primary | ICD-10-CM

## 2023-02-20 LAB
APPEARANCE UR: CLEAR
BACTERIA URNS QL MICRO: NEGATIVE /HPF
BILIRUB UR QL: NEGATIVE
CLUE CELLS VAG QL WET PREP: NORMAL
COLOR UR: ABNORMAL
DEPRECATED S PYO AG THROAT QL EIA: POSITIVE
EPITH CASTS URNS QL MICRO: ABNORMAL /LPF
GLUCOSE UR STRIP.AUTO-MCNC: NEGATIVE MG/DL
HCG UR QL: NEGATIVE
HCG UR QL: NEGATIVE
HGB UR QL STRIP: NEGATIVE
KETONES UR QL STRIP.AUTO: ABNORMAL MG/DL
KOH PREP SPEC: NORMAL
LEUKOCYTE ESTERASE UR QL STRIP.AUTO: ABNORMAL
NITRITE UR QL STRIP.AUTO: NEGATIVE
PH UR STRIP: 7 (ref 5–8)
PROT UR STRIP-MCNC: ABNORMAL MG/DL
RBC #/AREA URNS HPF: ABNORMAL /HPF (ref 0–5)
RPR SER QL: NONREACTIVE
SERVICE CMNT-IMP: NORMAL
SP GR UR REFRACTOMETRY: 1.01 (ref 1–1.03)
T VAGINALIS VAG QL WET PREP: NORMAL
UA: UC IF INDICATED,UAUC: ABNORMAL
UROBILINOGEN UR QL STRIP.AUTO: 1 EU/DL (ref 0.2–1)
WBC URNS QL MICRO: ABNORMAL /HPF (ref 0–4)

## 2023-02-20 PROCEDURE — 87255 GENET VIRUS ISOLATE HSV: CPT

## 2023-02-20 PROCEDURE — 81025 URINE PREGNANCY TEST: CPT

## 2023-02-20 PROCEDURE — 87210 SMEAR WET MOUNT SALINE/INK: CPT

## 2023-02-20 PROCEDURE — 99284 EMERGENCY DEPT VISIT MOD MDM: CPT

## 2023-02-20 PROCEDURE — 87491 CHLMYD TRACH DNA AMP PROBE: CPT

## 2023-02-20 PROCEDURE — 74011250636 HC RX REV CODE- 250/636: Performed by: PHYSICIAN ASSISTANT

## 2023-02-20 PROCEDURE — 81001 URINALYSIS AUTO W/SCOPE: CPT

## 2023-02-20 PROCEDURE — 74011000250 HC RX REV CODE- 250: Performed by: PHYSICIAN ASSISTANT

## 2023-02-20 PROCEDURE — 36415 COLL VENOUS BLD VENIPUNCTURE: CPT

## 2023-02-20 PROCEDURE — 87880 STREP A ASSAY W/OPTIC: CPT

## 2023-02-20 PROCEDURE — 86592 SYPHILIS TEST NON-TREP QUAL: CPT

## 2023-02-20 PROCEDURE — 96372 THER/PROPH/DIAG INJ SC/IM: CPT

## 2023-02-20 RX ORDER — AMOXICILLIN 875 MG/1
875 TABLET, FILM COATED ORAL 2 TIMES DAILY
Qty: 20 TABLET | Refills: 0 | Status: SHIPPED | OUTPATIENT
Start: 2023-02-20 | End: 2023-03-02

## 2023-02-20 RX ORDER — DOXYCYCLINE 100 MG/1
100 CAPSULE ORAL 2 TIMES DAILY
Qty: 14 CAPSULE | Refills: 0 | Status: SHIPPED | OUTPATIENT
Start: 2023-02-20 | End: 2023-02-27

## 2023-02-20 RX ORDER — METRONIDAZOLE 500 MG/1
500 TABLET ORAL 2 TIMES DAILY
Qty: 14 TABLET | Refills: 0 | Status: SHIPPED | OUTPATIENT
Start: 2023-02-20 | End: 2023-02-27

## 2023-02-20 RX ADMIN — LIDOCAINE HYDROCHLORIDE 500 MG: 10 INJECTION, SOLUTION EPIDURAL; INFILTRATION; INTRACAUDAL; PERINEURAL at 12:20

## 2023-02-20 NOTE — ED NOTES
Pt presents to ED complaining of sore throat onset yesterday, right side neck swelling noted upon assessment. Patient also reports possible exposure to STI and is requesting STI test and pregnancy test. Pt c/o frequency, urgency with urination, nausea and lower back pain onset 2 days ago. Patient c/o \"boil in between legs\" requesting evaluation. Two small red lumps noted upon assessment. Pt is alert and oriented x 4, RR even and unlabored, skin is warm and dry. Assessment completed and pt updated on plan of care. Call bell in reach. Emergency Department Nursing Plan of Care       The Nursing Plan of Care is developed from the Nursing assessment and Emergency Department Attending provider initial evaluation. The plan of care may be reviewed in the ED Provider note.     The Plan of Care was developed with the following considerations:   Patient / Family readiness to learn indicated by:verbalized understanding  Persons(s) to be included in education: patient  Barriers to Learning/Limitations:No    Signed     Kimberly Ngo RN    2/20/2023

## 2023-02-20 NOTE — ED PROVIDER NOTES
[facility] EMERGENCY DEPT  EMERGENCY DEPARTMENT ENCOUNTER       Pt Name: Heaven Gabriel  MRN: [id number]  [place] 1990  Date of evaluation: 2/20/2023  Provider: Paulette Hu PA-C   PCP: Yehuda Dunbar MD  Note Started: 11:28 AM 2/20/23     CHIEF COMPLAINT       Chief Complaint   Patient presents with    Sore Throat    Sexually Transmitted Disease    Back Pain    Skin Problem        HISTORY OF PRESENT ILLNESS: 1 or more elements      History From: Patient  HPI Limitations : None     Heaven Gabriel is a 33 y.o. female with medical history significant for hypertension and muscle spasms who presents via self with complaints of acute moderate aching sore throat X 1 day. States he woke up this morning was having trouble swallowing because her throat was hurting. No medications or limiting factors. Endorses persistent chronic bilateral low back pain and muscle spasms which are been present ever since she gave birth to her child this past August.  She has not followed up with specialist or PCP/OB/GYN. Patient also endorses concern for pregnancy because she had 1 positive and 1 negative pregnancy test at home. Also request STD testing. States that she has lesions to vaginal region concerning for boil. No fever, chills, nausea, vomiting, abdominal pain, pelvic pain, vaginal bleeding or discharge, urinary symptoms, constipation, diarrhea, chest pain, shortness of breath, wheezing. No modifying factors today. Nursing Notes were all reviewed and agreed with or any disagreements were addressed in the HPI. REVIEW OF SYSTEMS      Review of Systems   Constitutional:  Negative for activity change, appetite change, chills, fatigue, fever and unexpected weight change. HENT:  Positive for sore throat.  Negative for congestion, dental problem, drooling, ear discharge, ear pain, facial swelling, hearing loss, mouth sores, nosebleeds, postnasal drip, rhinorrhea, sinus pressure, sinus pain, sneezing, tinnitus, trouble swallowing and voice change. Eyes:  Negative for visual disturbance. Respiratory:  Negative for cough, chest tightness, shortness of breath and wheezing. Cardiovascular:  Negative for chest pain, palpitations and leg swelling. Gastrointestinal:  Negative for abdominal distention, abdominal pain, anal bleeding, blood in stool, constipation, diarrhea, nausea, rectal pain and vomiting. Genitourinary:  Positive for genital sores. Negative for decreased urine volume, difficulty urinating, dysuria, flank pain, frequency, hematuria, menstrual problem, pelvic pain, urgency, vaginal bleeding, vaginal discharge and vaginal pain. LMP 1/28/23   Musculoskeletal:  Positive for back pain. Negative for arthralgias, gait problem, joint swelling, myalgias, neck pain and neck stiffness. Skin:  Positive for rash. Neurological:  Negative for dizziness, tremors, seizures, syncope, facial asymmetry, speech difficulty, weakness, light-headedness, numbness and headaches. Psychiatric/Behavioral: Negative. Positives and Pertinent negatives as per HPI.     PAST HISTORY     Past Medical History:  Past Medical History:   Diagnosis Date    Hypertension     Other ill-defined conditions(799.89)     muscle spasms    Other ill-defined conditions(799.89)     a fib    Other ill-defined conditions(799.89)     angina       Past Surgical History:  Past Surgical History:   Procedure Laterality Date    HX ORTHOPAEDIC      left knee       Family History:  Family History   Problem Relation Age of Onset    Diabetes Mother     Hypertension Mother     Diabetes Maternal Grandfather     Hypertension Maternal Grandfather        Social History:  Social History     Tobacco Use    Smoking status: Every Day     Packs/day: 1.50     Years: 0.50     Pack years: 0.75     Types: Cigarettes    Smokeless tobacco: Never   Substance Use Topics    Alcohol use: No     Comment: occassionally    Drug use: Yes     Types: Marijuana Allergies: Allergies   Allergen Reactions    Other Food Anaphylaxis     ONION    Onion Anaphylaxis       CURRENT MEDICATIONS      Previous Medications    ONDANSETRON (ZOFRAN ODT) 8 MG DISINTEGRATING TABLET    Take 1 Tablet by mouth every eight (8) hours as needed for Nausea or Vomiting. SCREENINGS               No data recorded         PHYSICAL EXAM      ED Triage Vitals [02/20/23 1016]   ED Encounter Vitals Group      BP (!) 150/82      Pulse (Heart Rate) (!) 58      Resp Rate 16      Temp 97.5 °F (36.4 °C)      Temp src       O2 Sat (%) 100 %      Weight 246 lb 14.6 oz      Height 6' 1\"        Physical Exam  Vitals and nursing note reviewed. Exam conducted with a chaperone present. Constitutional:       General: She is not in acute distress. Appearance: Normal appearance. She is well-developed. She is not ill-appearing, toxic-appearing or diaphoretic. Comments: Well-appearing female sitting upright in bed typing on computer in no apparent distress. Speaking in clear complete sentences. No tripoding, drooling, trismus, muffled voice. HENT:      Head: Normocephalic and atraumatic. Jaw: There is normal jaw occlusion. No trismus, tenderness, swelling, pain on movement or malocclusion. Right Ear: Hearing and external ear normal.      Left Ear: Hearing and external ear normal.      Nose: Nose normal. No mucosal edema, congestion or rhinorrhea. Right Sinus: No maxillary sinus tenderness or frontal sinus tenderness. Left Sinus: No maxillary sinus tenderness or frontal sinus tenderness. Mouth/Throat:      Lips: No lesions. Mouth: Mucous membranes are moist. No angioedema. Dentition: No dental abscesses. Tongue: No lesions. Tongue does not deviate from midline. Palate: No mass and lesions. Pharynx: Uvula midline. Posterior oropharyngeal erythema present. No pharyngeal swelling, oropharyngeal exudate or uvula swelling. Tonsils:  Tonsillar exudate present. No tonsillar abscesses. 2+ on the right. 2+ on the left. Eyes:      Extraocular Movements: Extraocular movements intact. Conjunctiva/sclera: Conjunctivae normal.   Neck:      Trachea: Trachea and phonation normal.   Cardiovascular:      Rate and Rhythm: Normal rate and regular rhythm. Pulses: Normal pulses. Heart sounds: Normal heart sounds. Pulmonary:      Effort: Pulmonary effort is normal. No tachypnea, accessory muscle usage or respiratory distress. Breath sounds: Normal breath sounds and air entry. No stridor. No decreased breath sounds, wheezing, rhonchi or rales. Abdominal:      General: Abdomen is protuberant. Bowel sounds are normal. There is no distension. Palpations: Abdomen is soft. There is no hepatomegaly, splenomegaly, mass or pulsatile mass. Tenderness: There is no abdominal tenderness. There is no right CVA tenderness, left CVA tenderness, guarding or rebound. Hernia: There is no hernia in the left inguinal area or right inguinal area. Genitourinary:     Exam position: Supine. Pubic Area: No rash. Labia:         Right: No rash, tenderness, lesion or injury. Left: Tenderness and lesion present. No injury. Urethra: No prolapse, urethral pain, urethral swelling or urethral lesion. Musculoskeletal:         General: Normal range of motion. Cervical back: Full passive range of motion without pain and normal range of motion. Lymphadenopathy:      Cervical: Cervical adenopathy present. Right cervical: Superficial cervical adenopathy present. Left cervical: Superficial cervical adenopathy present. Lower Body: No right inguinal adenopathy. No left inguinal adenopathy. Skin:     General: Skin is warm and dry. Capillary Refill: Capillary refill takes less than 2 seconds. Findings: Rash present. No abscess. Neurological:      General: No focal deficit present.       Mental Status: She is alert and oriented to person, place, and time. Psychiatric:         Behavior: Behavior normal.         Thought Content: Thought content normal.         Judgment: Judgment normal.       Pulse Oximetry Analysis - Normal 100% on RA    DIAGNOSTIC RESULTS   LABS:     Recent Results (from the past 12 hour(s))   URINALYSIS W/ REFLEX CULTURE    Collection Time: 02/20/23 11:11 AM    Specimen: Urine   Result Value Ref Range    Color YELLOW/STRAW      Appearance CLEAR CLEAR      Specific gravity 1.015 1.003 - 1.030      pH (UA) 7.0 5.0 - 8.0      Protein TRACE (A) NEG mg/dL    Glucose Negative NEG mg/dL    Ketone TRACE (A) NEG mg/dL    Bilirubin Negative NEG      Blood Negative NEG      Urobilinogen 1.0 0.2 - 1.0 EU/dL    Nitrites Negative NEG      Leukocyte Esterase SMALL (A) NEG      WBC 5-10 0 - 4 /hpf    RBC 0-5 0 - 5 /hpf    Epithelial cells FEW FEW /lpf    Bacteria Negative NEG /hpf    UA:UC IF INDICATED CULTURE NOT INDICATED BY UA RESULT CNI     KOH, OTHER SOURCES    Collection Time: 02/20/23 11:11 AM    Specimen: Vagina; Other   Result Value Ref Range    Special Requests: NO SPECIAL REQUESTS      KOH NO YEAST SEEN     WET PREP    Collection Time: 02/20/23 11:11 AM    Specimen: Miscellaneous sample   Result Value Ref Range    Clue cells CLUE CELLS PRESENT      Wet prep NO TRICHOMONAS SEEN     STREP AG SCREEN, GROUP A    Collection Time: 02/20/23 11:11 AM    Specimen: Swab; Throat   Result Value Ref Range    Group A Strep Ag ID Positive (A) NEG     HCG URINE, QL. - POC    Collection Time: 02/20/23 11:28 AM   Result Value Ref Range    Pregnancy test,urine (POC) Negative NEG     HCG URINE, QL. - POC    Collection Time: 02/20/23 11:55 AM   Result Value Ref Range    Pregnancy test,urine (POC) Negative NEG          EKG: When ordered, EKG's are interpreted by the Emergency Department Physician in the absence of a cardiologist.  Please see their note for interpretation of EKG.       RADIOLOGY:  Non-plain film images such as CT, Ultrasound and MRI are read by the radiologist. Plain radiographic images are visualized and preliminarily interpreted by the ED Provider with the below findings:          Interpretation per the Radiologist below, if available at the time of this note:     No results found. PROCEDURES   Unless otherwise noted below, none  Procedures     CRITICAL CARE TIME   none    EMERGENCY DEPARTMENT COURSE and DIFFERENTIAL DIAGNOSIS/MDM   Initial assessment performed. The patients presenting problems have been discussed, and they are in agreement with the care plan formulated and outlined with them. I have encouraged them to ask questions as they arise throughout their visit. Vitals:    Vitals:    02/20/23 1016   BP: (!) 150/82   Pulse: (!) 58   Resp: 16   Temp: 97.5 °F (36.4 °C)   SpO2: 100%   Weight: 112 kg (246 lb 14.6 oz)   Height: 6' 1\" (1.854 m)        Patient was given the following medications:  Medications   cefTRIAXone (ROCEPHIN) 500 mg in lidocaine (PF) (XYLOCAINE) 10 mg/mL (1 %) IM injection (has no administration in time range)       CONSULTS: (Who and What was discussed)  None      Chronic Conditions: tobacco abuse, htn, chronic back pain/spasms    Social Determinants affecting Dx or Tx: None    Records Reviewed (source and summary): Prior medical records, Previous Radiology studies, and Nursing notes    CC/HPI Summary, DDx, ED Course, and Reassessment: Well-appearing afebrile and hemodynamically stable 67-year-old female presents with sore throat X 1 day, chronic bilateral low back pain/muscle spasms, concern for pregnancy (LMP 1/28/2023), vaginal lesions and concern for STD. On exam, patient does have erythematous 2+ bilateral tonsils with exudate. No peritonsillar abscess or retropharyngeal abscess. No tripoding, muffled voice, trismus, drooling. Speaking in clear complete sentences. Heart and lung sounds are normal.  Soft nonacute nontender abdomen.   Unremarkable back exam.  Patient does have 2 superficial ulcerated lesions to left lateral labia with localized induration. There is no fluctuance, pustules, streaking, warmth, drainage, bleeding, vesicles. Differential includes strep tonsillitis, viral pharyngitis, candidiasis, URI, STD including HSV and syphilis, UTI, pregnancy, abscess, chronic low back pain and muscle spasms. Will obtain labs and treat with antibiotics. TOBACCO COUNSELING:  Upon evaluation, pt expressed that they are a current tobacco user. Pt has been counseled on the dangers of smoking and was encouraged to quit as soon as possible in order to decrease further risks to their health. Pt has conveyed their understanding of the risks involved should they continue to use tobacco products. 4 min discussion. Disposition Considerations (Tests not done, Shared Decision Making, Pt Expectation of Test or Tx.):      Progress Note:   Updated pt on all returned results and findings. Discussed the importance of proper follow up as referred below along with return precautions. Pt in agreement with the care plan and expresses agreement with and understanding of all items discussed. FINAL IMPRESSION     1. Acute non-recurrent streptococcal tonsillitis    2. BV (bacterial vaginosis)    3. Vaginal lesion    4. Chronic bilateral low back pain without sciatica          DISPOSITION/PLAN   Emma Oquendo's  results have been reviewed with her. She has been counseled regarding her diagnosis, treatment, and plan. She verbally conveys understanding and agreement of the signs, symptoms, diagnosis, treatment and prognosis and additionally agrees to follow up as discussed. She also agrees with the care-plan and conveys that all of her questions have been answered.   I have also provided discharge instructions for her that include: educational information regarding their diagnosis and treatment, and list of reasons why they would want to return to the ED prior to their follow-up appointment, should her condition change. Discharged    12:13 PM  I have discussed with patient their diagnosis, treatment, and follow up plan. The patient agrees to follow up as outlined in discharge paperwork and also to return to the ED with any worsening. Severo Neighbours, PA-C         PATIENT REFERRED TO:  Follow-up Information       Follow up With Specialties Details Why Contact Info    Kai Trevino MD General Practice Schedule an appointment as soon as possible for a visit in 2 days As needed [address]      [facility] EMERGENCY DEPT Emergency Medicine Go to  As needed, If symptoms worsen [address] [facility]  [phone]              DISCHARGE MEDICATIONS:  Current Discharge Medication List        START taking these medications    Details   metroNIDAZOLE (FlagyL) 500 mg tablet Take 1 Tablet by mouth two (2) times a day for 7 days. Qty: 14 Tablet, Refills: 0  Start date: 2/20/2023, End date: 2/27/2023      doxycycline (MONODOX) 100 mg capsule Take 1 Capsule by mouth two (2) times a day for 7 days. Qty: 14 Capsule, Refills: 0  Start date: 2/20/2023, End date: 2/27/2023      amoxicillin (AMOXIL) 875 mg tablet Take 1 Tablet by mouth two (2) times a day for 10 days. Qty: 20 Tablet, Refills: 0  Start date: 2/20/2023, End date: 3/2/2023               DISCONTINUED MEDICATIONS:  Current Discharge Medication List          Shared Not Shared KARO: I have seen and evaluated the patient. My supervision physician was available for consultation. I am the Primary Clinician of Record. Severo Neighbours, PA-C (electronically signed)    (Please note that parts of this dictation were completed with voice recognition software. Quite often unanticipated grammatical, syntax, homophones, and other interpretive errors are inadvertently transcribed by the computer software. Please disregards these errors.  Please excuse any errors that have escaped final proofreading.)

## 2023-02-20 NOTE — ED NOTES
Discharge instructions were given to the patient by Fred Jacques RN      The patient left the Emergency Department ambulatory, alert and oriented and in no acute distress with 3 prescriptions. The patient was encouraged to call or return to the ED for worsening issues or problems and was encouraged to schedule a follow up appointment for continuing care. The patient verbalized understanding of discharge instructions and prescriptions, all questions were answered. The patient has no further concerns at this time.

## 2023-02-20 NOTE — ED TRIAGE NOTES
Sore throat starting las night both sides appear red, inflamed with right swelling greater than left. Also would like STD testing, denies discharge but noticed different scent. Chronic back pain increased two days ago. States two pregnancy tests at home, one positive and negative. Also now reports two boils her legs that need evaluation.

## 2023-02-22 LAB
HSV SPEC CULT: NORMAL
SPECIMEN SOURCE: NORMAL

## 2023-08-18 ENCOUNTER — HOSPITAL ENCOUNTER (EMERGENCY)
Facility: HOSPITAL | Age: 33
Discharge: HOME | End: 2023-08-18
Attending: STUDENT IN AN ORGANIZED HEALTH CARE EDUCATION/TRAINING PROGRAM

## 2023-08-18 VITALS
BODY MASS INDEX: 34.81 KG/M2 | HEART RATE: 67 BPM | OXYGEN SATURATION: 100 % | DIASTOLIC BLOOD PRESSURE: 77 MMHG | HEIGHT: 72 IN | WEIGHT: 257 LBS | SYSTOLIC BLOOD PRESSURE: 141 MMHG | RESPIRATION RATE: 20 BRPM | TEMPERATURE: 98.4 F

## 2023-08-18 DIAGNOSIS — T16.1XXA FOREIGN BODY OF RIGHT EAR, INITIAL ENCOUNTER: Primary | ICD-10-CM

## 2023-08-18 PROCEDURE — 99282 EMERGENCY DEPT VISIT SF MDM: CPT

## 2023-08-18 ASSESSMENT — ENCOUNTER SYMPTOMS
SHORTNESS OF BREATH: 0
BACK PAIN: 0
ABDOMINAL PAIN: 0

## 2023-08-18 ASSESSMENT — PAIN - FUNCTIONAL ASSESSMENT: PAIN_FUNCTIONAL_ASSESSMENT: NONE - DENIES PAIN

## 2023-08-18 NOTE — ED PROVIDER NOTES
08/18/2023 09:50:05 AM      Discharge Note: The patient is stable for discharge home. The signs, symptoms, diagnosis, and discharge instructions have been discussed, understanding conveyed, and agreed upon. The patient is to follow up as recommended or return to ER should their symptoms worsen. PATIENT REFERRED TO:  Dawna Saravia MD  [facility] [phone]    In 1 week  As needed       DISCHARGE MEDICATIONS:     Medication List        ASK your doctor about these medications      ondansetron 8 MG Tbdp disintegrating tablet  Commonly known as: ZOFRAN-ODT                DISCONTINUED MEDICATIONS:  Discharge Medication List as of 8/18/2023  9:50 AM          I have seen and evaluated the patient autonomously. My supervision physician was on site and available for consultation if needed. I am the Primary Clinician of Record. MITCHEL Pizano NP (electronically signed)    (Please note that parts of this dictation were completed with voice recognition software. Quite often unanticipated grammatical, syntax, homophones, and other interpretive errors are inadvertently transcribed by the computer software. Please disregards these errors.  Please excuse any errors that have escaped final proofreading.)         MITCHEL Trujillo NP  08/18/23 0961

## 2025-05-21 NOTE — PROGRESS NOTES
Problem: Adult Inpatient Plan of Care  Goal: Plan of Care Review  Outcome: Progressing  Goal: Patient-Specific Goal (Individualized)  Outcome: Progressing  Goal: Absence of Hospital-Acquired Illness or Injury  Outcome: Progressing  Goal: Optimal Comfort and Wellbeing  Outcome: Progressing  Goal: Readiness for Transition of Care  Outcome: Progressing     Problem: Diabetes Comorbidity  Goal: Blood Glucose Level Within Targeted Range  Outcome: Progressing     Problem: Wound  Goal: Optimal Coping  Outcome: Progressing  Goal: Optimal Functional Ability  Outcome: Progressing  Goal: Absence of Infection Signs and Symptoms  Outcome: Progressing  Goal: Improved Oral Intake  Outcome: Progressing  Goal: Optimal Pain Control and Function  Outcome: Progressing  Goal: Skin Health and Integrity  Outcome: Progressing  Goal: Optimal Wound Healing  Outcome: Progressing     Problem: Infection  Goal: Absence of Infection Signs and Symptoms  Outcome: Progressing      Paged the Hospitalist @ [phone] for patient regarding patient's current complaints of nausea and vomiting. Awaiting response at present. Confirmed urine pregnancy test on file. Resulted 2/27 at 1540. No medications given at this time. Will continue to monitor the patient. 0930:  Received a callback from the Hospitalist.  No new medication orders. Labs ordered and collected. No complaints of nausea or vomiting verbalized at present.

## 2025-05-30 ENCOUNTER — HOSPITAL ENCOUNTER (EMERGENCY)
Facility: HOSPITAL | Age: 35
Discharge: HOME | End: 2025-05-30
Payer: MEDICAID

## 2025-05-30 VITALS
OXYGEN SATURATION: 98 % | BODY MASS INDEX: 33.74 KG/M2 | TEMPERATURE: 98.1 F | SYSTOLIC BLOOD PRESSURE: 165 MMHG | HEART RATE: 65 BPM | DIASTOLIC BLOOD PRESSURE: 105 MMHG | WEIGHT: 255.73 LBS | RESPIRATION RATE: 18 BRPM

## 2025-05-30 DIAGNOSIS — R51.9 ACUTE FACIAL PAIN: Primary | ICD-10-CM

## 2025-05-30 DIAGNOSIS — F17.200 SMOKING ADDICTION: ICD-10-CM

## 2025-05-30 DIAGNOSIS — K04.7 DENTAL INFECTION: ICD-10-CM

## 2025-05-30 DIAGNOSIS — I10 ESSENTIAL HYPERTENSION: ICD-10-CM

## 2025-05-30 DIAGNOSIS — K02.9 PAIN DUE TO DENTAL CARIES: ICD-10-CM

## 2025-05-30 DIAGNOSIS — M62.830 MUSCLE SPASM OF BACK: ICD-10-CM

## 2025-05-30 PROCEDURE — 99283 EMERGENCY DEPT VISIT LOW MDM: CPT

## 2025-05-30 PROCEDURE — 6370000000 HC RX 637 (ALT 250 FOR IP): Performed by: PHYSICIAN ASSISTANT

## 2025-05-30 RX ORDER — IBUPROFEN 600 MG/1
600 TABLET, FILM COATED ORAL EVERY 8 HOURS PRN
Qty: 20 TABLET | Refills: 0 | Status: SHIPPED | OUTPATIENT
Start: 2025-05-30

## 2025-05-30 RX ORDER — AMOXICILLIN 500 MG/1
500 CAPSULE ORAL 2 TIMES DAILY
Qty: 14 CAPSULE | Refills: 0 | Status: SHIPPED | OUTPATIENT
Start: 2025-05-30 | End: 2025-06-06

## 2025-05-30 RX ORDER — LIDOCAINE 50 MG/G
1 PATCH TOPICAL DAILY
Qty: 10 PATCH | Refills: 0 | Status: SHIPPED | OUTPATIENT
Start: 2025-05-30 | End: 2025-06-09

## 2025-05-30 RX ORDER — CYCLOBENZAPRINE HCL 10 MG
10 TABLET ORAL 3 TIMES DAILY PRN
Qty: 21 TABLET | Refills: 0 | Status: SHIPPED | OUTPATIENT
Start: 2025-05-30 | End: 2025-06-09

## 2025-05-30 RX ADMIN — DIPHENHYDRAMINE HYDROCHLORIDE: 25 SOLUTION ORAL at 14:28

## 2025-05-30 ASSESSMENT — PAIN - FUNCTIONAL ASSESSMENT: PAIN_FUNCTIONAL_ASSESSMENT: 0-10

## 2025-05-30 ASSESSMENT — PAIN DESCRIPTION - PAIN TYPE: TYPE: ACUTE PAIN

## 2025-05-30 ASSESSMENT — PAIN DESCRIPTION - ORIENTATION: ORIENTATION: LEFT;UPPER

## 2025-05-30 ASSESSMENT — PAIN SCALES - GENERAL: PAINLEVEL_OUTOF10: 9

## 2025-05-30 ASSESSMENT — VISUAL ACUITY: OU: 1

## 2025-05-30 ASSESSMENT — PAIN DESCRIPTION - LOCATION: LOCATION: TEETH

## 2025-05-30 ASSESSMENT — PAIN DESCRIPTION - DESCRIPTORS: DESCRIPTORS: ACHING

## 2025-05-30 NOTE — ED NOTES
Pt presents ambulatory to the ED c/o L upper dental pain with facial swelling x 1 week. No OTC medications taken for pain. Needs dental referral.    Emergency Department Nursing Plan of Care       The Nursing Plan of Care is developed from the Nursing assessment and Emergency Department Attending provider initial evaluation.  The plan of care may be reviewed in the “ED Provider note”.      The Plan of Care was developed with the following considerations:  Patient / Family readiness to learn indicated by:verbalized understanding  Persons(s) to be included in education: patient  Barriers to Learning/Limitations:None      Signed     JEANETTE RAMIREZ RN    5/30/2025   2:22 PM

## 2025-05-30 NOTE — ED PROVIDER NOTES
[facility] EMERGENCY DEPARTMENT  EMERGENCY DEPARTMENT ENCOUNTER       Pt Name: Torsten Lr  MRN: [id number]  Birthdate 1990  Date of evaluation: 5/30/2025  Provider: JEMAL Pennington   PCP: Nilson Braun MD  Note Started: 2:23 PM EDT 5/30/25     CHIEF COMPLAINT       Chief Complaint   Patient presents with    Dental Pain        HISTORY OF PRESENT ILLNESS: 1 or more elements      History From: Patient  HPI Limitations: None     Torsten Lr is a 35 y.o. female who presents ambulatory with several days of left upper dental pain.  She tells me her face is felt swollen and she has used a warm compress and that seems to help.  She denies any throat pain or difficulty swallowing.  She denies any fever.  She tells me she is \"also having back spasms\".  She tells me she \"used to get lidocaine patches\".  She denies any injuries.  She denies any medication allergies.  She tells me she takes no medications daily.  She tells me she does have a history of \"hypertension\".     Nursing Notes were all reviewed and agreed with or any disagreements were addressed in the HPI.     REVIEW OF SYSTEMS      Review of Systems     Positives and Pertinent negatives as per HPI.    PAST HISTORY     Past Medical History:  Past Medical History:   Diagnosis Date    Hypertension     Other ill-defined conditions(799.89)     a fib    Other ill-defined conditions(799.89)     muscle spasms    Other ill-defined conditions(799.89)     angina       Past Surgical History:  Past Surgical History:   Procedure Laterality Date    ORTHOPEDIC SURGERY      left knee       Family History:  Family History   Problem Relation Age of Onset    Hypertension Mother     Diabetes Maternal Grandfather     Hypertension Maternal Grandfather     Diabetes Mother        Social History:  Social History     Tobacco Use    Smoking status: Every Day     Current packs/day: 1.50     Types: Cigarettes    Smokeless tobacco: Never   Substance Use Topics

## 2025-05-30 NOTE — DISCHARGE INSTRUCTIONS
Emergency Dental Care     [facility]  [address]; [place], [place] [address]  Open M-F: 8AM - 5PM  Main Phone: [phone]  Pediatric Phone: [phone]  $70 for Emergency Care  $60 for first routine care, then pay by sliding scale based upon income.    [facility]  [address]; [place] [address]  Phone: [phone]    [employer]  [facility]: [address]; [place] [address]  [facility]: [address] [place] [address]; [place] [address]  Phone: [phone]  Open Monday - Friday 8AM-4:30 PM  Emergency Walk-In Care: Monday-Thursday 8 AM-12 PM    [facility]  [facility], [facility], [address] [place] [address], 1st Floor  [facility] starting at 8:30 AM M-F  Phone: [phone], press 2  Fee: $150 per tooth (x-ray & extractions only)  Pediatrics Phone: [phone], 8-5 M-F    [facility]  [facility], [facility], [address] [place] [address], [address]nd Floor, [address]; [place], [place]  [facility] starting at 8:30 AM-3 PM M - F    [facility]  [address] [place] [address]  Phone: [phone] or [phone]  Emergency Hours: 9:30AM - 11AM (extractions)  Simple tooth extraction $ per tooth. $75 for x-ray    [facility]  [address] [place] [address]  [facility] Residents only, over the age of 18  Phone: [phone]. Leave message saying you need an appointment to register.  Hours: Tuesday Evenings

## 2025-06-10 ENCOUNTER — HOSPITAL ENCOUNTER (EMERGENCY)
Facility: HOSPITAL | Age: 35
Discharge: HOME | End: 2025-06-10
Payer: MEDICAID

## 2025-06-10 ENCOUNTER — APPOINTMENT (OUTPATIENT)
Facility: HOSPITAL | Age: 35
End: 2025-06-10
Payer: MEDICAID

## 2025-06-10 VITALS
HEIGHT: 72 IN | BODY MASS INDEX: 33.72 KG/M2 | WEIGHT: 249 LBS | OXYGEN SATURATION: 99 % | SYSTOLIC BLOOD PRESSURE: 165 MMHG | HEART RATE: 59 BPM | TEMPERATURE: 97.3 F | RESPIRATION RATE: 18 BRPM | DIASTOLIC BLOOD PRESSURE: 100 MMHG

## 2025-06-10 DIAGNOSIS — S90.31XA CONTUSION OF RIGHT FOOT, INITIAL ENCOUNTER: Primary | ICD-10-CM

## 2025-06-10 PROCEDURE — 73630 X-RAY EXAM OF FOOT: CPT

## 2025-06-10 PROCEDURE — 6370000000 HC RX 637 (ALT 250 FOR IP): Performed by: PHYSICIAN ASSISTANT

## 2025-06-10 PROCEDURE — 99283 EMERGENCY DEPT VISIT LOW MDM: CPT

## 2025-06-10 RX ORDER — IBUPROFEN 800 MG/1
800 TABLET, FILM COATED ORAL EVERY 8 HOURS PRN
Qty: 20 TABLET | Refills: 0 | Status: SHIPPED | OUTPATIENT
Start: 2025-06-10

## 2025-06-10 RX ORDER — IBUPROFEN 400 MG/1
800 TABLET, FILM COATED ORAL
Status: COMPLETED | OUTPATIENT
Start: 2025-06-10 | End: 2025-06-10

## 2025-06-10 RX ORDER — ACETAMINOPHEN 500 MG
1000 TABLET ORAL EVERY 6 HOURS PRN
Qty: 20 TABLET | Refills: 0 | Status: SHIPPED | OUTPATIENT
Start: 2025-06-10

## 2025-06-10 RX ORDER — LIDOCAINE 50 MG/G
1 PATCH TOPICAL DAILY
Qty: 10 PATCH | Refills: 0 | Status: SHIPPED | OUTPATIENT
Start: 2025-06-10 | End: 2025-06-20

## 2025-06-10 RX ADMIN — IBUPROFEN 800 MG: 400 TABLET, FILM COATED ORAL at 15:05

## 2025-06-10 ASSESSMENT — PAIN DESCRIPTION - DESCRIPTORS: DESCRIPTORS: ACHING

## 2025-06-10 ASSESSMENT — ENCOUNTER SYMPTOMS
DIARRHEA: 0
SHORTNESS OF BREATH: 0
EYE PAIN: 0
WHEEZING: 0
BACK PAIN: 0
CHEST TIGHTNESS: 0
COUGH: 0
RHINORRHEA: 0
SORE THROAT: 0
ABDOMINAL PAIN: 0
VOMITING: 0
NAUSEA: 0

## 2025-06-10 ASSESSMENT — PAIN SCALES - GENERAL: PAINLEVEL_OUTOF10: 9

## 2025-06-10 ASSESSMENT — PAIN DESCRIPTION - ORIENTATION: ORIENTATION: RIGHT

## 2025-06-10 ASSESSMENT — PAIN DESCRIPTION - LOCATION: LOCATION: LEG

## 2025-06-10 ASSESSMENT — PAIN - FUNCTIONAL ASSESSMENT: PAIN_FUNCTIONAL_ASSESSMENT: 0-10

## 2025-06-10 NOTE — ED PROVIDER NOTES
800 MG tablet  Commonly known as: ADVIL;MOTRIN  Take 1 tablet by mouth every 8 hours as needed for Pain or Fever     lidocaine 5 %  Commonly known as: LIDODERM  Place 1 patch onto the skin daily for 10 days 12 hours on, 12 hours off.            ASK your doctor about these medications      ondansetron 8 MG Tbdp disintegrating tablet  Commonly known as: ZOFRAN-ODT               Where to Get Your Medications        These medications were sent to [id number] [address] -  [phone] - F [phone]  [address] [place] [id number]      Phone: [phone]   acetaminophen 500 MG tablet  ibuprofen 800 MG tablet  lidocaine 5 %           DISCONTINUED MEDICATIONS:  Current Discharge Medication List          I have seen and evaluated the patient autonomously. My supervision physician was on site and available for consultation if needed.     I am the Primary Clinician of Record.   Candi Alvarenga PA-C (electronically signed)    (Please note that parts of this dictation were completed with voice recognition software. Quite often unanticipated grammatical, syntax, homophones, and other interpretive errors are inadvertently transcribed by the computer software. Please disregards these errors. Please excuse any errors that have escaped final proofreading.)         Candi Alvarenga PA-C  06/10/25 0822
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
